# Patient Record
Sex: FEMALE | Race: WHITE | NOT HISPANIC OR LATINO | Employment: PART TIME | URBAN - METROPOLITAN AREA
[De-identification: names, ages, dates, MRNs, and addresses within clinical notes are randomized per-mention and may not be internally consistent; named-entity substitution may affect disease eponyms.]

---

## 2024-03-15 ENCOUNTER — OFFICE VISIT (OUTPATIENT)
Dept: INTERNAL MEDICINE | Facility: CLINIC | Age: 29
End: 2024-03-15
Payer: COMMERCIAL

## 2024-03-15 VITALS
TEMPERATURE: 98 F | DIASTOLIC BLOOD PRESSURE: 70 MMHG | HEART RATE: 78 BPM | SYSTOLIC BLOOD PRESSURE: 116 MMHG | BODY MASS INDEX: 26.29 KG/M2 | WEIGHT: 139.13 LBS | RESPIRATION RATE: 16 BRPM

## 2024-03-15 DIAGNOSIS — Z3A.01 LESS THAN 8 WEEKS GESTATION OF PREGNANCY: Primary | ICD-10-CM

## 2024-03-15 NOTE — PROGRESS NOTES
Subjective       Moises Vasquez is a 28 y.o. female.     Chief Complaint   Patient presents with    Referral     OBGYN    pregnant       History obtained from the patient.      History of Present Illness       The patient is here for a referral to OB/GYN.  She states she has had multiple positive home pregnancy test.  Her last menstrual period was 2/7/2024 through 2/10/2024.  Overall she feels well.  She has some mild breast tenderness.  There is no fatigue, nausea, or vomiting.  She has not started prenatal vitamins.    The following portions of the patient's history were reviewed and updated as appropriate: allergies, current medications, past family history, past medical history, past social history, past surgical history, and problem list.      Review of Systems   Constitutional:  Negative for fatigue.   Gastrointestinal:  Negative for nausea and vomiting.           Objective     Blood pressure 116/70, pulse 78, temperature 98 °F (36.7 °C), temperature source Infrared, resp. rate 16, weight 63.1 kg (139 lb 2 oz), not currently breastfeeding.    Physical Exam  Vitals and nursing note reviewed.   Constitutional:       Comments: BMI greater than 25.   Neurological:      Mental Status: She is alert.   Psychiatric:         Mood and Affect: Mood normal.           Assessment & Plan   Diagnoses and all orders for this visit:    1. Less than 8 weeks gestation of pregnancy (Primary)  -     Ambulatory Referral to Obstetrics / Gynecology    The only medication the patient is currently on is Albuterol MDI, and I assured her she could continue to use that as needed.  Recommended she start a prenatal vitamin daily as soon as possible.    Recommended she check with her PCP or her GYN prior to starting any prescription or over-the-counter medication.          Return prn

## 2024-04-12 ENCOUNTER — INITIAL PRENATAL (OUTPATIENT)
Dept: OBSTETRICS AND GYNECOLOGY | Facility: CLINIC | Age: 29
End: 2024-04-12
Payer: COMMERCIAL

## 2024-04-12 VITALS — WEIGHT: 139 LBS | DIASTOLIC BLOOD PRESSURE: 76 MMHG | BODY MASS INDEX: 26.26 KG/M2 | SYSTOLIC BLOOD PRESSURE: 102 MMHG

## 2024-04-12 DIAGNOSIS — Z34.90 PRENATAL CARE, ANTEPARTUM: Primary | ICD-10-CM

## 2024-04-12 NOTE — PROGRESS NOTES
Initial ob visit     CC- Here for care of pregnancy        Moises Vasquez is a 28 y.o. female, , who presents for her first obstetrical visit.  Patient's last menstrual period was 2024.. Her GABBIE is 2024, by Last Menstrual Period. Current GA is 9w2d.     Initial positive test date : 3/12/24, UPT        Her periods are every 29-30 days.  Prior obstetric issues: none  Patient's past medical history is significant for:  none .  Family history of genetic issues (includes FOB): none  Prior infections concerning in pregnancy (Rash, fever in last 2 weeks): No  Varicella Hx - history of chicken pox  Prior testing for Cystic Fibrosis Carrier or Sickle Cell Trait- never  Prepregnancy BMI - Body mass index is 26.26 kg/m².  History of STD: no  Hx of HSV for patient or partner: no  US done today: Yes.  Findings showed single viable IUP with FHR of 171bpm measuring 9w0d which is consistent with her LMP.  I have personally evaluated the U/S and agree with the findings. Natasha Harper MD      OB History    Para Term  AB Living   1             SAB IAB Ectopic Molar Multiple Live Births                    # Outcome Date GA Lbr Umesh/2nd Weight Sex Type Anes PTL Lv   1 Current                Additional Pertinent History   Last Pap : 22 Result: negative HPV: unknown      Last Completed Pap Smear            PAP SMEAR (Every 3 Years) Next due on 2022  LIQUID-BASED PAP SMEAR, [facility] ([facility])    2016  Done                  History of abnormal Pap smear: no  Family history of uterine, colon, breast, or ovarian cancer: yes - PGF with colon cancer  Feelings of Anxiety or Depression: no  Tobacco Usage?: No   Alcohol/Drug Use?: NO  Over the age of 35 at delivery: no  Genetic Screening: desires to discuss in the future      PMH    Current Outpatient Medications:     albuterol sulfate  (90 Base) MCG/ACT inhaler, INHALE 2 PUFFS BY MOUTH EVERY 4 HOURS AS  NEEDED FOR WHEEZE, Disp: 1 g, Rfl: 1     Past Medical History:   Diagnosis Date    Allergic     Asthma         History reviewed. No pertinent surgical history.    Review of Systems   Review of Systems    Patient Reports:  some nausea and vomiting a couple of times  Patient Denies:excessive nausea , excessive vomiting, and vaginal bleeding  All systems reviewed and otherwise normal.    I have reviewed and agree with the HPI, ROS, and historical information as entered above. Natasha Harper MD      /76   Wt 63 kg (139 lb)   LMP 2024   BMI 26.26 kg/m²     The additional following portions of the patient's history were reviewed and updated as appropriate: allergies, current medications, past family history, past medical history, past social history, past surgical history, and problem list.    Physical Exam  General:  well developed; well nourished  no acute distress   Chest/Respiratory: No labored breathing, normal respiratory effort, normal appearance, no respiratory noises noted   Heart:  normal rate, regular rhythm,  no murmurs, rubs, or gallops   Thyroid: normal to inspection and palpation   Breasts:  Not performed.   Abdomen: soft, non-tender; no masses  no umbilical or inguinal hernias are present  no hepato-splenomegaly   Pelvis: Not performed.        Assessment and Plan    Problem List Items Addressed This Visit          Gravid and     Prenatal care, antepartum - Primary    Relevant Orders    Obstetric Panel    HIV-1 / O / 2 Ag / Antibody    Urine Culture - Urine, Urine, Clean Catch    Urinalysis With Microscopic - Urine, Clean Catch    Chlamydia trachomatis, Neisseria gonorrhoeae, PCR - Urine, Urine, Clean Catch       Pregnancy at 9w2d  Reviewed routine prenatal care with the office and educational materials given  Lab(s) Ordered  Discussed options for genetic testing including first trimester nuchal translucency screen, genetic disease carrier testing, quadruple screen, and  NIPT  Nausea/Vomiting - she does not desire medications at this time.  Discussed conservative ways to help with nausea.  Patient is on Prenatal vitamins  Return in about 3 weeks (around 5/3/2024).      Natasha Harper MD  04/12/2024

## 2024-04-13 LAB
ABO GROUP BLD: NORMAL
APPEARANCE UR: CLEAR
BACTERIA #/AREA URNS HPF: NORMAL /[HPF]
BASOPHILS # BLD AUTO: 0 X10E3/UL (ref 0–0.2)
BASOPHILS NFR BLD AUTO: 0 %
BILIRUB UR QL STRIP: NEGATIVE
BLD GP AB SCN SERPL QL: NEGATIVE
CASTS URNS QL MICRO: NORMAL /LPF
COLOR UR: YELLOW
EOSINOPHIL # BLD AUTO: 0 X10E3/UL (ref 0–0.4)
EOSINOPHIL NFR BLD AUTO: 0 %
EPI CELLS #/AREA URNS HPF: NORMAL /HPF (ref 0–10)
ERYTHROCYTE [DISTWIDTH] IN BLOOD BY AUTOMATED COUNT: 13 % (ref 11.7–15.4)
GLUCOSE UR QL STRIP: NEGATIVE
HBV SURFACE AG SERPL QL IA: NEGATIVE
HCT VFR BLD AUTO: 39 % (ref 34–46.6)
HCV IGG SERPL QL IA: NON REACTIVE
HGB BLD-MCNC: 13.1 G/DL (ref 11.1–15.9)
HGB UR QL STRIP: NEGATIVE
HIV 1+2 AB+HIV1 P24 AG SERPL QL IA: NON REACTIVE
IMM GRANULOCYTES # BLD AUTO: 0 X10E3/UL (ref 0–0.1)
IMM GRANULOCYTES NFR BLD AUTO: 0 %
KETONES UR QL STRIP: NEGATIVE
LEUKOCYTE ESTERASE UR QL STRIP: NEGATIVE
LYMPHOCYTES # BLD AUTO: 2.6 X10E3/UL (ref 0.7–3.1)
LYMPHOCYTES NFR BLD AUTO: 28 %
MCH RBC QN AUTO: 31.6 PG (ref 26.6–33)
MCHC RBC AUTO-ENTMCNC: 33.6 G/DL (ref 31.5–35.7)
MCV RBC AUTO: 94 FL (ref 79–97)
MICRO URNS: NORMAL
MICRO URNS: NORMAL
MONOCYTES # BLD AUTO: 0.7 X10E3/UL (ref 0.1–0.9)
MONOCYTES NFR BLD AUTO: 7 %
NEUTROPHILS # BLD AUTO: 5.9 X10E3/UL (ref 1.4–7)
NEUTROPHILS NFR BLD AUTO: 65 %
NITRITE UR QL STRIP: NEGATIVE
PH UR STRIP: 6.5 [PH] (ref 5–7.5)
PLATELET # BLD AUTO: 309 X10E3/UL (ref 150–450)
PROT UR QL STRIP: NEGATIVE
RBC # BLD AUTO: 4.14 X10E6/UL (ref 3.77–5.28)
RBC #/AREA URNS HPF: NORMAL /HPF (ref 0–2)
RH BLD: POSITIVE
RPR SER QL: NON REACTIVE
RUBV IGG SERPL IA-ACNC: 2.61 INDEX
SP GR UR STRIP: 1.02 (ref 1–1.03)
UROBILINOGEN UR STRIP-MCNC: 0.2 MG/DL (ref 0.2–1)
WBC # BLD AUTO: 9.4 X10E3/UL (ref 3.4–10.8)
WBC #/AREA URNS HPF: NORMAL /HPF (ref 0–5)

## 2024-04-14 LAB
BACTERIA UR CULT: NO GROWTH
BACTERIA UR CULT: NORMAL

## 2024-04-15 LAB
C TRACH RRNA SPEC QL NAA+PROBE: NEGATIVE
N GONORRHOEA RRNA SPEC QL NAA+PROBE: NEGATIVE

## 2024-05-02 ENCOUNTER — ROUTINE PRENATAL (OUTPATIENT)
Dept: OBSTETRICS AND GYNECOLOGY | Facility: CLINIC | Age: 29
End: 2024-05-02
Payer: COMMERCIAL

## 2024-05-02 VITALS — WEIGHT: 139 LBS | BODY MASS INDEX: 26.26 KG/M2 | DIASTOLIC BLOOD PRESSURE: 80 MMHG | SYSTOLIC BLOOD PRESSURE: 110 MMHG

## 2024-05-02 DIAGNOSIS — Z34.90 PRENATAL CARE, ANTEPARTUM: Primary | ICD-10-CM

## 2024-05-02 LAB
GLUCOSE UR STRIP-MCNC: NEGATIVE MG/DL
PROT UR STRIP-MCNC: NEGATIVE MG/DL

## 2024-05-02 NOTE — PROGRESS NOTES
OB FOLLOW UP  CC- Here for care of pregnancy        Moises Vasquez is a 28 y.o.  12w1d patient being seen today for her obstetrical follow up visit. Patient reports no complaints.     Her prenatal care is complicated by (and status) :   Patient Active Problem List   Diagnosis    Mild intermittent asthma without complication    Prenatal care, antepartum       Genetic testing?: declines.  NOB labs reviewed    Ultrasound Today: No    ROS -   Patient Denies: leaking of fluid, vaginal bleeding, and excessive vomiting  All other systems reviewed and are negative.     The additional following portions of the patient's history were reviewed and updated as appropriate: allergies, current medications, past family history, past medical history, past social history, past surgical history, and problem list.    I have reviewed and agree with the HPI, ROS, and historical information as entered above. Natasha Harper MD          /80   Wt 63 kg (139 lb)   LMP 2024   BMI 26.26 kg/m²         EXAM:     Prenatal Vitals  BP: 110/80  Weight: 63 kg (139 lb)   Fetal Heart Rate: 165          Urine Glucose Read-only: Negative  Urine Protein Read-only: Negative       Assessment and Plan    Problem List Items Addressed This Visit          Gravid and     Prenatal care, antepartum - Primary    Relevant Orders    POC Urinalysis Dipstick (Completed)       Pregnancy at 12w1d  Labs reviewed from New OB Visit.  Counseled on genetic testing, carrier status and option for NT screen  Activity and Exercise discussed.  Patient is on Prenatal vitamins  Declines genetic testing  Return in about 4 weeks (around 2024).    Natasha Harper MD  2024

## 2024-05-30 ENCOUNTER — ROUTINE PRENATAL (OUTPATIENT)
Dept: OBSTETRICS AND GYNECOLOGY | Facility: CLINIC | Age: 29
End: 2024-05-30
Payer: COMMERCIAL

## 2024-05-30 VITALS — WEIGHT: 144.4 LBS | BODY MASS INDEX: 27.28 KG/M2 | DIASTOLIC BLOOD PRESSURE: 68 MMHG | SYSTOLIC BLOOD PRESSURE: 104 MMHG

## 2024-05-30 DIAGNOSIS — Z34.90 PRENATAL CARE, ANTEPARTUM: Primary | ICD-10-CM

## 2024-05-30 LAB
GLUCOSE UR STRIP-MCNC: NEGATIVE MG/DL
PROT UR STRIP-MCNC: NEGATIVE MG/DL

## 2024-05-30 RX ORDER — PRENATAL VIT NO.126/IRON/FOLIC 28MG-0.8MG
TABLET ORAL DAILY
COMMUNITY

## 2024-05-30 NOTE — PROGRESS NOTES
OB FOLLOW UP  CC- Here for care of pregnancy        Moises Vasquez is a 28 y.o.  16w1d patient being seen today for her obstetrical follow up visit. Patient reports occasional headaches, pt states she thinks it is related to caffeine, headaches have improved.     Her prenatal care is complicated by (and status) :   Patient Active Problem List   Diagnosis    Mild intermittent asthma without complication    Prenatal care, antepartum         Ultrasound Today: No    AFP: declines    ROS -   Patient Denies: leaking of fluid, vaginal bleeding, dysuria, excessive vomiting, and more than 6 contractions per hour  All other systems reviewed and are negative.       The additional following portions of the patient's history were reviewed and updated as appropriate: allergies and current medications.      I have reviewed and agree with the HPI, ROS, and historical information as entered above. Natasha Harper MD          EXAM:     Prenatal Vitals  BP: 104/68  Weight: 65.5 kg (144 lb 6.4 oz)   Fetal Heart Rate: 158         Urine Glucose Read-only: Negative  Urine Protein Read-only: Negative           Assessment and Plan    Problem List Items Addressed This Visit          Gravid and     Prenatal care, antepartum - Primary    Relevant Orders    US Ob 14 + Weeks Single or First Gestation    POC Urinalysis Dipstick (Completed)       Pregnancy at 16w1d  Fetal status reassuring.   Counseled on MSAFP alone in relation to OTD and placental issues.    Anatomy scan next visit.   Activity and Exercise discussed.  Patient is on Prenatal vitamins  Return in about 4 weeks (around 2024) for anatomy usg.    Natasha Harper MD  2024

## 2024-06-16 DIAGNOSIS — J45.20 MILD INTERMITTENT ASTHMA WITHOUT COMPLICATION: ICD-10-CM

## 2024-06-17 ENCOUNTER — TELEPHONE (OUTPATIENT)
Dept: OBSTETRICS AND GYNECOLOGY | Facility: CLINIC | Age: 29
End: 2024-06-17
Payer: COMMERCIAL

## 2024-06-17 RX ORDER — ALBUTEROL SULFATE 90 UG/1
2 AEROSOL, METERED RESPIRATORY (INHALATION) EVERY 4 HOURS PRN
Qty: 1 G | Refills: 1 | Status: SHIPPED | OUTPATIENT
Start: 2024-06-17

## 2024-06-17 NOTE — TELEPHONE ENCOUNTER
FREDIS Wong (SPOUSE)    [phone]    PT IS 19wks    PT PUT A MSG 6/5/24 ABOUT MIGRAINE     A LOT OF BACK & FORTH COMMUNICATION     CAN SOMEONE GIVE THEM A CALL     THE MIGRAINE IS CONTINUING, SHE HAD 1-2 GOOD DAYS ONCE SHE STARTED MAGNESIUM    PER  NOT DOING WELL.

## 2024-06-17 NOTE — TELEPHONE ENCOUNTER
"Patient of Dr. Harper; G1 @ 18w 5d. LOV 05/30/24.  Attempted to reach patient. Left voice message informing her I will return her 's call.   Returned call to patient's .   States patient has been having H/A's almost daily for the past couple of weeks and is miserable.  Spoke with patient. She started taking Magnesium 400 mg once daily about 2 weeks ago with no relief.   States she has a \"regular\" H/A daily but has had 4 migraines in the past 3 weeks, including the one she has now. Has N&V with the migraines but no visual changes.   States she has checked her BP and it is normal. Denies any other symptoms or problems.   Discussed with JED Hines. She states if patient has not had any other caffeine today, she can try Excedrine Migraine or she can try: drink one coke, drink one bottle of water, Benadryl 50 mg, Tylenol 1000 mg, warm shower, and rest. If H/A still unrelieved she will need to be seen.   Informed patient. She v/u and agreed.   "

## 2024-06-27 ENCOUNTER — ROUTINE PRENATAL (OUTPATIENT)
Dept: OBSTETRICS AND GYNECOLOGY | Facility: CLINIC | Age: 29
End: 2024-06-27
Payer: COMMERCIAL

## 2024-06-27 VITALS — DIASTOLIC BLOOD PRESSURE: 72 MMHG | WEIGHT: 147 LBS | SYSTOLIC BLOOD PRESSURE: 108 MMHG | BODY MASS INDEX: 27.78 KG/M2

## 2024-06-27 DIAGNOSIS — Z34.90 PRENATAL CARE, ANTEPARTUM: Primary | ICD-10-CM

## 2024-06-27 DIAGNOSIS — G43.819 OTHER MIGRAINE WITHOUT STATUS MIGRAINOSUS, INTRACTABLE: ICD-10-CM

## 2024-06-27 LAB
GLUCOSE UR STRIP-MCNC: NEGATIVE MG/DL
PROT UR STRIP-MCNC: NEGATIVE MG/DL

## 2024-06-27 PROCEDURE — 0502F SUBSEQUENT PRENATAL CARE: CPT | Performed by: OBSTETRICS & GYNECOLOGY

## 2024-06-27 RX ORDER — MAGNESIUM OXIDE 400 MG/1
400 TABLET ORAL DAILY
COMMUNITY

## 2024-06-27 RX ORDER — ACETAMINOPHEN 500 MG
500 TABLET ORAL EVERY 6 HOURS PRN
COMMUNITY

## 2024-06-27 NOTE — PROGRESS NOTES
OB FOLLOW UP  CC- Here for care of pregnancy        Moises Vasquez is a 28 y.o.  20w1d patient being seen today for her obstetrical follow up visit. Patient reports daily headaches x1 month, occasional migraines, and vomiting with headaches/migraines. Patient reports taking Tylenol, Magnesium, and JNA's headache cocktail for headaches and migraines. Patient states that the migraine cocktail aborted her migraine last week, but has had moderate to severe headaches daily since then. Patient denies h/o headaches or migraines prior to pregnancy. Patient reports drinking about a quarter of a gallon of water and 1 coffee daily.     Her prenatal care is complicated by (and status) : see below.  Patient Active Problem List   Diagnosis    Mild intermittent asthma without complication    Prenatal care, antepartum       US done today: Yes.  Findings showed Fetus in breech presentation fetal heart rate of 154.  Posterior placenta and three-vessel cord noted.  Normal fluid volume.  Size consistent with dates with estimated fetal weight of 10 ounces.  No fetal anomaly seen, however unable to adequately visualize profile, heart views, and lower extremities.  Cervical length 46 mm..  I have personally evaluated the U/S and agree with the findings. Natasha Harper MD    AFP was declined.    ROS -     Patient Denies: Leaking of fluid, vaginal bleeding, dysuria, excessive vomiting, and more than 6 contractions per hour  Fetal Movement : Yes  All other systems reviewed and are negative.       The additional following portions of the patient's history were reviewed and updated as appropriate: allergies and current medications.      I have reviewed and agree with the HPI, ROS, and historical information as entered above. Natasha Harper MD      /72   Wt 66.7 kg (147 lb)   LMP 2024   BMI 27.78 kg/m²       EXAM:     Prenatal Vitals  BP: 108/72  Weight: 66.7 kg (147 lb)   Fetal Heart Rate: 154/US           Urine Glucose Read-only: Negative  Urine Protein Read-only: Negative       Assessment and Plan    Problem List Items Addressed This Visit          Gravid and     Prenatal care, antepartum - Primary    Relevant Orders    POC Urinalysis Dipstick (Completed)    Ambulatory Referral to Neurology    US Ob Follow Up Transabdominal Approach     Other Visit Diagnoses       Other migraine without status migrainosus, intractable        Relevant Medications    acetaminophen (TYLENOL) 500 MG tablet    Other Relevant Orders    Ambulatory Referral to Neurology            Pregnancy at 20w1d  Anatomy scan today is incomplete, follow up in 4 weeks for additional views. Anatomy that was visualized was within normal limits.  Fetal status reassuring.   Activity and Exercise discussed.  Patient is on Prenatal vitamins  Return in about 4 weeks (around 2024) for ultrasound.      Natasha Harper MD  2024

## 2024-07-29 ENCOUNTER — ROUTINE PRENATAL (OUTPATIENT)
Dept: OBSTETRICS AND GYNECOLOGY | Facility: CLINIC | Age: 29
End: 2024-07-29
Payer: COMMERCIAL

## 2024-07-29 VITALS — BODY MASS INDEX: 28.72 KG/M2 | DIASTOLIC BLOOD PRESSURE: 64 MMHG | SYSTOLIC BLOOD PRESSURE: 110 MMHG | WEIGHT: 152 LBS

## 2024-07-29 DIAGNOSIS — Z34.90 PRENATAL CARE, ANTEPARTUM: Primary | ICD-10-CM

## 2024-07-29 LAB
GLUCOSE UR STRIP-MCNC: NEGATIVE MG/DL
PROT UR STRIP-MCNC: NEGATIVE MG/DL

## 2024-07-29 PROCEDURE — 0502F SUBSEQUENT PRENATAL CARE: CPT | Performed by: ADVANCED PRACTICE MIDWIFE

## 2024-07-29 NOTE — PROGRESS NOTES
OB FOLLOW UP  CC- Here for care of pregnancy        Moises Vasquez is a 28 y.o.  24w5d patient being seen today for her obstetrical follow up visit. Patient reports no complaints.     Her prenatal care is complicated by (and status) : see below.  Patient Active Problem List   Diagnosis    Mild intermittent asthma without complication    Prenatal care, antepartum       US done today: Yes.  Findings showed , Vertex, 3VC, normal amount of AF. EFW 1#8oz (37%) and AC 33%. Anatomy not previously seen is normal.   I have personally evaluated the U/S and agree with the findings. JED Carrera    Reviewed 1 hr glucose testing and TDAP next visit.    ROS -   Patient Denies: leaking of fluid, vaginal bleeding, and excessive vomiting  Fetal Movement : normal  All other systems reviewed and are negative.       The additional following portions of the patient's history were reviewed and updated as appropriate: allergies, current medications, past family history, past medical history, past social history, past surgical history, and problem list.      I have reviewed and agree with the HPI, ROS, and historical information as entered above. JED Carrera      /64   Wt 68.9 kg (152 lb)   LMP 2024   BMI 28.72 kg/m²       EXAM:     Prenatal Vitals  BP: 110/64  Weight: 68.9 kg (152 lb)   Fetal Heart Rate: 148bpm               Urine Glucose Read-only: Negative  Urine Protein Read-only: Negative       Assessment and Plan    Problem List Items Addressed This Visit       Prenatal care, antepartum - Primary    Relevant Orders    POC Urinalysis Dipstick (Completed)       Pregnancy at 24w5d  Fetal status reassuring.  anatomy scan completed today and within normal limits.  1 hour gtt, CBC, Antibody screen, TDAP, and RPR next visit. Instructions given  Discussed/encouraged TDAP vaccination after 28 weeks  Reviewed Pre-eclampsia signs/symptoms  Activity and Exercise discussed.  Return in about 4  weeks (around 8/26/2024) for Meredith MARTINEZ.      Serena Plaza, JED  07/29/2024

## 2024-08-20 ENCOUNTER — TELEPHONE (OUTPATIENT)
Dept: OBSTETRICS AND GYNECOLOGY | Facility: CLINIC | Age: 29
End: 2024-08-20
Payer: COMMERCIAL

## 2024-08-20 ENCOUNTER — TELEPHONE (OUTPATIENT)
Dept: NEUROLOGY | Facility: CLINIC | Age: 29
End: 2024-08-20
Payer: COMMERCIAL

## 2024-08-20 NOTE — TELEPHONE ENCOUNTER
[facility] received a fax that requires your attention. The document has been indexed to the patient’s chart for your review.      Reason for sending: NEEDS PROVIDER SIGNATURE    Documents Description: WRITTEN ORDER FOR BREAST PUMP AND SUPPLIES    Name of Sender: SHANKAR    Date Indexed: 08/20/24

## 2024-08-20 NOTE — TELEPHONE ENCOUNTER
----- Message from [facility] d/b/a [employer] sent at 8/20/2024 11:54 AM EDT -----  Regarding: Appointment Cancellation Request  Contact: [phone]  Moises Vasquez would like to cancel the following appointments:    Marvin Krishnamurthy in [facility] ([phone]), 10/3/2024  3:00 PM    Comments:  No longer having daily headaches.

## 2024-08-23 ENCOUNTER — TELEPHONE (OUTPATIENT)
Dept: OBSTETRICS AND GYNECOLOGY | Facility: CLINIC | Age: 29
End: 2024-08-23
Payer: COMMERCIAL

## 2024-08-24 NOTE — TELEPHONE ENCOUNTER
Provider: DR. MADI PEDRAZA      The [facility] received a fax that requires your attention. The document has been indexed to the patient's chart for your review.     Reason for sending: REVIEW / AUTHORIZATION     Documents Description: WRITTEN ORDER FOR BREAST PUMP     Name of Sender: [employer]     Date Indexed: 08/23/24    Notes (if needed): INDEXED INTO CHART AS EXT MED RECS 08/21/24.

## 2024-08-26 NOTE — TELEPHONE ENCOUNTER
Patient is a 28 year old,  patient of Dr. Harper. She is currently 28w5d gestation. She is calling about having a breast pump form signed and faxed. Stamped form and place in fax folder up front. Called and informed patient that form should be faxed today. She verbalized understanding.

## 2024-08-27 ENCOUNTER — ROUTINE PRENATAL (OUTPATIENT)
Dept: OBSTETRICS AND GYNECOLOGY | Facility: CLINIC | Age: 29
End: 2024-08-27
Payer: COMMERCIAL

## 2024-08-27 VITALS — DIASTOLIC BLOOD PRESSURE: 66 MMHG | WEIGHT: 158 LBS | BODY MASS INDEX: 29.85 KG/M2 | SYSTOLIC BLOOD PRESSURE: 104 MMHG

## 2024-08-27 DIAGNOSIS — Z34.90 PRENATAL CARE, ANTEPARTUM: Primary | ICD-10-CM

## 2024-08-27 LAB
ERYTHROCYTE [DISTWIDTH] IN BLOOD BY AUTOMATED COUNT: 12.7 % (ref 12.3–15.4)
GLUCOSE 1H P 50 G GLC PO SERPL-MCNC: 103 MG/DL (ref 65–139)
GLUCOSE UR STRIP-MCNC: NEGATIVE MG/DL
HCT VFR BLD AUTO: 36 % (ref 34–46.6)
HGB BLD-MCNC: 12 G/DL (ref 12–15.9)
MCH RBC QN AUTO: 32.4 PG (ref 26.6–33)
MCHC RBC AUTO-ENTMCNC: 33.3 G/DL (ref 31.5–35.7)
MCV RBC AUTO: 97.3 FL (ref 79–97)
PLATELET # BLD AUTO: 297 10*3/MM3 (ref 140–450)
PROT UR STRIP-MCNC: NEGATIVE MG/DL
RBC # BLD AUTO: 3.7 10*6/MM3 (ref 3.77–5.28)
WBC # BLD AUTO: 9.18 10*3/MM3 (ref 3.4–10.8)

## 2024-08-27 PROCEDURE — 0502F SUBSEQUENT PRENATAL CARE: CPT | Performed by: OBSTETRICS & GYNECOLOGY

## 2024-08-27 NOTE — PROGRESS NOTES
OB FOLLOW UP  CC- Here for care of pregnancy        Moises Vasquez is a 28 y.o.  28w6d patient being seen today for her obstetrical follow up. Patient reports no complaints.     Patient undergoing Glucola testing today. She is due for her testing at 9:50.       MBT: A+  Rhogam: is not indicated.  28 week packet: reviewed with patient , counseled on fetal movement , pediatrician list reviewed, breast pump discussed, and childbirth classes reviewed  TDAP: declines  Ultrasound Today: No    Her prenatal care is complicated by (and status) : see below.  Patient Active Problem List   Diagnosis    Mild intermittent asthma without complication    Prenatal care, antepartum         ROS -   Patient Denies: Loss of Fluid, Vaginal Spotting, Vision Changes, Headaches, Nausea , Vomiting , Contractions, Epigastric pain, and skin itching  Fetal Movement : normal    The additional following portions of the patient's history were reviewed and updated as appropriate: allergies and current medications.    I have reviewed and agree with the HPI, ROS, and historical information as entered above. Natasha Harper MD      /66   Wt 71.7 kg (158 lb)   LMP 2024   BMI 29.85 kg/m²         EXAM:     Prenatal Vitals  BP: 104/66  Weight: 71.7 kg (158 lb)   Fetal Heart Rate: 143      Fundal Height (cm): 29 cm        Urine Glucose Read-only: Negative  Urine Protein Read-only: Negative         Assessment and Plan    Problem List Items Addressed This Visit          Gravid and     Prenatal care, antepartum - Primary    Overview     Anatomy complete and WNL at 24w5d         Relevant Orders    POC Urinalysis Dipstick (Completed)    CBC (No Diff)    Gestational Screen 1 Hr ([facility])    Antibody Screen    RPR, Rfx Qn RPR / Confirm TP       Pregnancy at 28w6d  1 hr Glucola, CBC, RPR. Antibody screen and TDAP declines  Fetal movement/PTL or Labor precautions  Activity and Exercise discussed.  Return in about 4  weeks (around 9/24/2024).        Natasha Harper MD  08/27/2024

## 2024-08-28 LAB
BLD GP AB SCN SERPL QL: NEGATIVE
RPR SER QL: NON REACTIVE

## 2024-09-23 ENCOUNTER — ROUTINE PRENATAL (OUTPATIENT)
Dept: OBSTETRICS AND GYNECOLOGY | Facility: CLINIC | Age: 29
End: 2024-09-23
Payer: COMMERCIAL

## 2024-09-23 VITALS — SYSTOLIC BLOOD PRESSURE: 102 MMHG | WEIGHT: 162.4 LBS | BODY MASS INDEX: 30.69 KG/M2 | DIASTOLIC BLOOD PRESSURE: 64 MMHG

## 2024-09-23 DIAGNOSIS — Z34.93 PRENATAL CARE IN THIRD TRIMESTER: Primary | ICD-10-CM

## 2024-09-23 LAB
GLUCOSE UR STRIP-MCNC: NEGATIVE MG/DL
PROT UR STRIP-MCNC: NEGATIVE MG/DL

## 2024-09-23 PROCEDURE — 0502F SUBSEQUENT PRENATAL CARE: CPT | Performed by: OBSTETRICS & GYNECOLOGY

## 2024-10-07 ENCOUNTER — ROUTINE PRENATAL (OUTPATIENT)
Dept: OBSTETRICS AND GYNECOLOGY | Facility: CLINIC | Age: 29
End: 2024-10-07
Payer: COMMERCIAL

## 2024-10-07 VITALS — DIASTOLIC BLOOD PRESSURE: 76 MMHG | SYSTOLIC BLOOD PRESSURE: 110 MMHG | WEIGHT: 166.2 LBS | BODY MASS INDEX: 31.4 KG/M2

## 2024-10-07 DIAGNOSIS — O26.849 UTERINE SIZE DATE DISCREPANCY PREGNANCY: ICD-10-CM

## 2024-10-07 DIAGNOSIS — Z34.93 PRENATAL CARE IN THIRD TRIMESTER: Primary | ICD-10-CM

## 2024-10-07 LAB
GLUCOSE UR STRIP-MCNC: NEGATIVE MG/DL
PROT UR STRIP-MCNC: NEGATIVE MG/DL

## 2024-10-07 PROCEDURE — 0502F SUBSEQUENT PRENATAL CARE: CPT | Performed by: OBSTETRICS & GYNECOLOGY

## 2024-10-07 NOTE — PROGRESS NOTES
OB FOLLOW UP  CC- Here for care of pregnancy        Moises Vasquez is a 29 y.o.  34w5d patient being seen today for her obstetrical follow up visit. Patient reports no complaints.     Her prenatal care is complicated by (and status) :   Patient Active Problem List   Diagnosis    Mild intermittent asthma without complication    Prenatal care, antepartum       Flu Status: Declines  RSV: declines.  Ultrasound Today: No  Non Stress Test: No.    ROS -   Patient Denies: Loss of Fluid, Vaginal Spotting, Vision Changes, Headaches, Nausea , Vomiting , Contractions, Epigastric pain, and skin itching  Fetal Movement : normal  All other systems reviewed and are negative.       The additional following portions of the patient's history were reviewed and updated as appropriate: allergies, current medications, past family history, past medical history, past social history, past surgical history, and problem list.    I have reviewed and agree with the HPI, ROS, and historical information as entered above. Natasha Harper MD      /76   Wt 75.4 kg (166 lb 3.2 oz)   LMP 2024   BMI 31.40 kg/m²       EXAM:     Prenatal Vitals  BP: 110/76  Weight: 75.4 kg (166 lb 3.2 oz)   Fetal Heart Rate: 145      Fundal Height (cm): 36 cm        Urine Glucose Read-only: Negative  Urine Protein Read-only: Negative           Assessment and Plan    Problem List Items Addressed This Visit    None  Visit Diagnoses       Prenatal care in third trimester    -  Primary    Relevant Orders    POC Urinalysis Dipstick (Completed)    Uterine size date discrepancy pregnancy        Relevant Orders    US Ob Follow Up Transabdominal Approach            Pregnancy at 34w5d  Fetal status reassuring.   Activity and Exercise discussed.  Fetal movement/PTL or Labor precautions  U/S ordered at follow up  GBS next visit  Return in about 2 weeks (around 10/21/2024) for ultrasound.    Natasha Harper MD  10/07/2024

## 2024-10-22 ENCOUNTER — ROUTINE PRENATAL (OUTPATIENT)
Dept: OBSTETRICS AND GYNECOLOGY | Facility: CLINIC | Age: 29
End: 2024-10-22
Payer: COMMERCIAL

## 2024-10-22 ENCOUNTER — LAB (OUTPATIENT)
Dept: LAB | Facility: HOSPITAL | Age: 29
End: 2024-10-22
Payer: COMMERCIAL

## 2024-10-22 VITALS — WEIGHT: 170 LBS | SYSTOLIC BLOOD PRESSURE: 110 MMHG | BODY MASS INDEX: 32.12 KG/M2 | DIASTOLIC BLOOD PRESSURE: 70 MMHG

## 2024-10-22 DIAGNOSIS — Z34.90 PREGNANCY, UNSPECIFIED GESTATIONAL AGE: Primary | ICD-10-CM

## 2024-10-22 DIAGNOSIS — Z34.90 PRENATAL CARE, ANTEPARTUM: Primary | ICD-10-CM

## 2024-10-22 LAB
GLUCOSE UR STRIP-MCNC: NEGATIVE MG/DL
PROT UR STRIP-MCNC: NEGATIVE MG/DL

## 2024-10-22 PROCEDURE — 87081 CULTURE SCREEN ONLY: CPT

## 2024-10-22 NOTE — PROGRESS NOTES
OB FOLLOW UP  CC- Here for care of pregnancy        Moises Vasquez is a 29 y.o.  36w6d patient being seen today for her obstetrical follow up visit. Patient reports mild swelling in feet. Patient declines cervical check today.     Her prenatal care is complicated by (and status) : see below.  Patient Active Problem List   Diagnosis    Mild intermittent asthma without complication    Prenatal care, antepartum       GBS Status: Done Today. She is not allergic to PCN.    No Known Allergies       Flu Status: Declines  RSV status: declines.   Her Delivery Plan is: Does not desire IOL    US done today: Yes.  Findings showed   Female infant in cephalic presentation fetal heart rate of 126.  Posterior placenta and three-vessel cord noted.  Normal fluid volume with an VALERIA of 11.1.  Estimated fetal weight 6 pounds 3 ounces which is 32nd percentile.  Of note head measurements are first and 7th percentile.  This is most likely related to positioning..  I have personally evaluated the U/S and agree with the findings. Natasha Harper MD    Non Stress Test: No.    ROS -   Patient Denies: Loss of Fluid, Vaginal Spotting, and Contractions  Fetal Movement : normal  All other systems reviewed and are negative.       The additional following portions of the patient's history were reviewed and updated as appropriate: allergies and current medications.    I have reviewed and agree with the HPI, ROS, and historical information as entered above. Natasha Harper MD        EXAM:     Prenatal Vitals  BP: 110/70  Weight: 77.1 kg (170 lb)                              Assessment and Plan    Problem List Items Addressed This Visit          Gravid and     Prenatal care, antepartum - Primary    Overview     At 36 weeks and 6 days-  Female infant in cephalic presentation fetal heart rate of 126.  Posterior placenta and three-vessel cord noted.  Normal fluid volume with an VALERIA of 11.1.  Estimated fetal weight 6  pounds 3 ounces which is 32nd percentile.  Of note head measurements are first and 7th percentile.  This is most likely related to positioning.            Pregnancy at 36w6d  Fetal status reassuring.   Patient declines induction of labor or cervical check.  Reviewed Pre-eclampsia signs/symptoms  Delivery options reviewed with patient  Signs of labor reviewed  Kick counts reviewed  Activity and Exercise discussed.  Return in about 1 week (around 10/29/2024).    Natasha Harper MD  10/22/2024

## 2024-10-24 PROBLEM — B95.1 POSITIVE GBS TEST: Status: ACTIVE | Noted: 2024-10-24

## 2024-10-24 LAB — BACTERIA SPEC AEROBE CULT: ABNORMAL

## 2024-10-28 ENCOUNTER — ROUTINE PRENATAL (OUTPATIENT)
Dept: OBSTETRICS AND GYNECOLOGY | Facility: CLINIC | Age: 29
End: 2024-10-28
Payer: COMMERCIAL

## 2024-10-28 VITALS — WEIGHT: 170.4 LBS | BODY MASS INDEX: 32.2 KG/M2 | SYSTOLIC BLOOD PRESSURE: 104 MMHG | DIASTOLIC BLOOD PRESSURE: 64 MMHG

## 2024-10-28 DIAGNOSIS — B95.1 POSITIVE GBS TEST: ICD-10-CM

## 2024-10-28 DIAGNOSIS — Z34.90 PRENATAL CARE, ANTEPARTUM: Primary | ICD-10-CM

## 2024-10-28 PROCEDURE — 0502F SUBSEQUENT PRENATAL CARE: CPT | Performed by: OBSTETRICS & GYNECOLOGY

## 2024-10-28 NOTE — PROGRESS NOTES
OB FOLLOW UP  CC- Here for care of pregnancy        Moises Vasquez is a 29 y.o.  37w5d patient being seen today for her obstetrical follow up visit. Patient reports no complaints.     Pt unable to leave urine sample yet.     Her prenatal care is complicated by (and status) : see below.  Patient Active Problem List   Diagnosis    Mild intermittent asthma without complication    Prenatal care, antepartum    Positive GBS test       GBS Status:   Group B Strep Culture   Date Value Ref Range Status   10/22/2024 Streptococcus agalactiae (Group B) (A)  Final     Comment:       This organism is considered to be universally susceptible to penicillin.  No further antibiotic testing will be performed. If Clindamycin or Erythromycin is the drug of choice, notify the laboratory within 7 days to request susceptibility testing.         No Known Allergies       Flu Status: Declines  Her Delivery Plan is: Does not desire IOL    US today: no  Non Stress Test: No.    ROS -   Patient Denies: Loss of Fluid, Vaginal Spotting, Vision Changes, Headaches, Nausea , Vomiting , Contractions, Epigastric pain, and skin itching  Fetal Movement : normal  All other systems reviewed and are negative.       The additional following portions of the patient's history were reviewed and updated as appropriate: allergies and current medications.    I have reviewed and agree with the HPI, ROS, and historical information as entered above. Natasha Harper MD        EXAM:     Prenatal Vitals  BP: 104/64  Weight: 77.3 kg (170 lb 6.4 oz)   Fetal Heart Rate: 130   Fundal Height (cm): 38 cm                      Assessment and Plan    Problem List Items Addressed This Visit          Gravid and     Prenatal care, antepartum - Primary    Overview     At 36 weeks and 6 days-  Female infant in cephalic presentation fetal heart rate of 126.  Posterior placenta and three-vessel cord noted.  Normal fluid volume with an VALERIA of 11.1.   Estimated fetal weight 6 pounds 3 ounces which is 32nd percentile.  Of note head measurements are first and 7th percentile.  This is most likely related to positioning.            Other    Positive GBS test    Overview     Treat in labor            Pregnancy at 37w5d  Fetal status reassuring.   Reviewed Pre-eclampsia signs/symptoms  Delivery options reviewed with patient  Signs of labor reviewed  Kick counts reviewed  Activity and Exercise discussed.  Return in about 1 week (around 11/4/2024).    Natasha Harper MD  10/28/2024

## 2024-11-04 ENCOUNTER — ROUTINE PRENATAL (OUTPATIENT)
Dept: OBSTETRICS AND GYNECOLOGY | Facility: CLINIC | Age: 29
End: 2024-11-04
Payer: COMMERCIAL

## 2024-11-04 VITALS — SYSTOLIC BLOOD PRESSURE: 106 MMHG | WEIGHT: 170.6 LBS | DIASTOLIC BLOOD PRESSURE: 66 MMHG | BODY MASS INDEX: 32.23 KG/M2

## 2024-11-04 DIAGNOSIS — B95.1 POSITIVE GBS TEST: ICD-10-CM

## 2024-11-04 DIAGNOSIS — Z34.90 PRENATAL CARE, ANTEPARTUM: Primary | ICD-10-CM

## 2024-11-04 LAB
GLUCOSE UR STRIP-MCNC: NEGATIVE MG/DL
PROT UR STRIP-MCNC: NEGATIVE MG/DL

## 2024-11-04 PROCEDURE — 0502F SUBSEQUENT PRENATAL CARE: CPT | Performed by: OBSTETRICS & GYNECOLOGY

## 2024-11-04 NOTE — PROGRESS NOTES
OB FOLLOW UP  CC- Here for care of pregnancy        Moises Vasquez is a 29 y.o.  38w5d patient being seen today for her obstetrical follow up visit. Patient reports no complaints today.      Her prenatal care is complicated by (and status) : see below.  Patient Active Problem List   Diagnosis    Mild intermittent asthma without complication    Prenatal care, antepartum    Positive GBS test       GBS Status:   Group B Strep Culture   Date Value Ref Range Status   10/22/2024 Streptococcus agalactiae (Group B) (A)  Final     Comment:       This organism is considered to be universally susceptible to penicillin.  No further antibiotic testing will be performed. If Clindamycin or Erythromycin is the drug of choice, notify the laboratory within 7 days to request susceptibility testing.         No Known Allergies       Flu Status: Declines  Her Delivery Plan is: Does not desire IOL    US today: no  Non Stress Test: No.    ROS -   Patient Denies: Loss of Fluid, Vaginal Spotting, Vision Changes, Headaches, Nausea , Vomiting , Contractions, Epigastric pain, and skin itching  Fetal Movement : normal  All other systems reviewed and are negative.       The additional following portions of the patient's history were reviewed and updated as appropriate: allergies and current medications.    I have reviewed and agree with the HPI, ROS, and historical information as entered above. Natasha Harper MD        EXAM:     Prenatal Vitals  BP: 106/66  Weight: 77.4 kg (170 lb 9.6 oz)   Fetal Heart Rate: 143   Fundal Height (cm): 38 cm          Urine Glucose Read-only: Negative  Urine Protein Read-only: Negative           Assessment and Plan    Problem List Items Addressed This Visit          Gravid and     Prenatal care, antepartum - Primary    Overview     At 36 weeks and 6 days-  Female infant in cephalic presentation fetal heart rate of 126.  Posterior placenta and three-vessel cord noted.  Normal fluid  volume with an VALERIA of 11.1.  Estimated fetal weight 6 pounds 3 ounces which is 32nd percentile.  Of note head measurements are first and 7th percentile.  This is most likely related to positioning.         Relevant Orders    POC Urinalysis Dipstick (Completed)       Other    Positive GBS test    Overview     Treat in labor            Pregnancy at 38w5d  Fetal status reassuring.   Reviewed Pre-eclampsia signs/symptoms  Delivery options reviewed with patient  Signs of labor reviewed  Kick counts reviewed  Activity and Exercise discussed.  Return in about 1 week (around 11/11/2024).    Natasha Harper MD  11/04/2024

## 2024-11-11 ENCOUNTER — ROUTINE PRENATAL (OUTPATIENT)
Dept: OBSTETRICS AND GYNECOLOGY | Facility: CLINIC | Age: 29
End: 2024-11-11
Payer: COMMERCIAL

## 2024-11-11 VITALS — DIASTOLIC BLOOD PRESSURE: 68 MMHG | BODY MASS INDEX: 32.5 KG/M2 | SYSTOLIC BLOOD PRESSURE: 108 MMHG | WEIGHT: 172 LBS

## 2024-11-11 DIAGNOSIS — B95.1 POSITIVE GBS TEST: ICD-10-CM

## 2024-11-11 DIAGNOSIS — Z34.90 PRENATAL CARE, ANTEPARTUM: Primary | ICD-10-CM

## 2024-11-11 DIAGNOSIS — O48.0 POST-TERM PREGNANCY, 40-42 WEEKS OF GESTATION: ICD-10-CM

## 2024-11-11 LAB
GLUCOSE UR STRIP-MCNC: NEGATIVE MG/DL
PROT UR STRIP-MCNC: NEGATIVE MG/DL

## 2024-11-11 PROCEDURE — 0502F SUBSEQUENT PRENATAL CARE: CPT | Performed by: OBSTETRICS & GYNECOLOGY

## 2024-11-11 NOTE — PROGRESS NOTES
OB FOLLOW UP  CC- Here for care of pregnancy        Moises Vasquez is a 29 y.o.  39w5d patient being seen today for her obstetrical follow up visit. Patient reports no complaints.     Pt declines cervix check.     Her prenatal care is complicated by (and status) : see below.  Patient Active Problem List   Diagnosis    Mild intermittent asthma without complication    Prenatal care, antepartum    Positive GBS test       GBS Status:   Group B Strep Culture   Date Value Ref Range Status   10/22/2024 Streptococcus agalactiae (Group B) (A)  Final     Comment:       This organism is considered to be universally susceptible to penicillin.  No further antibiotic testing will be performed. If Clindamycin or Erythromycin is the drug of choice, notify the laboratory within 7 days to request susceptibility testing.         No Known Allergies       Flu Status: Declines  Her Delivery Plan is: Does not desire IOL    US today: no  Non Stress Test: No.    ROS -   Patient Denies: Loss of Fluid, Vaginal Spotting, Vision Changes, Headaches, Nausea , Vomiting , Contractions, Epigastric pain, and skin itching  Fetal Movement : normal  All other systems reviewed and are negative.       The additional following portions of the patient's history were reviewed and updated as appropriate: allergies and current medications.    I have reviewed and agree with the HPI, ROS, and historical information as entered above. Natasha Harper MD        EXAM:     Prenatal Vitals  BP: 108/68  Weight: 78 kg (172 lb)   Fetal Heart Rate: 130   Fundal Height (cm): 39 cm          Urine Glucose Read-only: Negative  Urine Protein Read-only: Negative           Assessment and Plan    Problem List Items Addressed This Visit          Gravid and     Prenatal care, antepartum - Primary    Overview     At 36 weeks and 6 days-  Female infant in cephalic presentation fetal heart rate of 126.  Posterior placenta and three-vessel cord noted.   Normal fluid volume with an VALERIA of 11.1.  Estimated fetal weight 6 pounds 3 ounces which is 32nd percentile.  Of note head measurements are first and 7th percentile.  This is most likely related to positioning.         Relevant Orders    POC Urinalysis Dipstick (Completed)       Other    Positive GBS test    Overview     Treat in labor          Other Visit Diagnoses       Post-term pregnancy, 40-42 weeks of gestation        Relevant Orders    US Ob Follow Up Transabdominal Approach    US Fetal Biophysical Profile;With Non-Stress Testing            Pregnancy at 39w5d  Fetal status reassuring.   Reviewed Pre-eclampsia signs/symptoms  Delivery options reviewed with patient  Signs of labor reviewed  Kick counts reviewed  Activity and Exercise discussed.  Return in about 1 week (around 11/18/2024) for ultrasound, nst.    Natasha Harper MD  11/11/2024

## 2024-11-16 ENCOUNTER — HOSPITAL ENCOUNTER (INPATIENT)
Facility: HOSPITAL | Age: 29
LOS: 3 days | Discharge: HOME | End: 2024-11-19
Attending: OBSTETRICS & GYNECOLOGY | Admitting: OBSTETRICS & GYNECOLOGY
Payer: COMMERCIAL

## 2024-11-16 PROBLEM — O99.820 GBS (GROUP B STREPTOCOCCUS CARRIER), +RV CULTURE, CURRENTLY PREGNANT: Status: ACTIVE | Noted: 2024-10-24

## 2024-11-16 PROBLEM — J45.909 ASTHMA AFFECTING PREGNANCY IN THIRD TRIMESTER: Status: ACTIVE | Noted: 2021-07-22

## 2024-11-16 PROBLEM — Z37.9 NORMAL LABOR: Status: ACTIVE | Noted: 2024-11-16

## 2024-11-16 PROBLEM — O99.513 ASTHMA AFFECTING PREGNANCY IN THIRD TRIMESTER: Status: ACTIVE | Noted: 2021-07-22

## 2024-11-16 LAB
ABO GROUP BLD: NORMAL
ABO GROUP BLD: NORMAL
ALP SERPL-CCNC: 144 U/L (ref 39–117)
ALT SERPL W P-5'-P-CCNC: 8 U/L (ref 1–33)
AMPHET+METHAMPHET UR QL: NEGATIVE
AMPHETAMINES UR QL: NEGATIVE
AST SERPL-CCNC: 13 U/L (ref 1–32)
BARBITURATES UR QL SCN: NEGATIVE
BENZODIAZ UR QL SCN: NEGATIVE
BILIRUB SERPL-MCNC: 0.2 MG/DL (ref 0–1.2)
BLD GP AB SCN SERPL QL: NEGATIVE
BUPRENORPHINE SERPL-MCNC: NEGATIVE NG/ML
CANNABINOIDS SERPL QL: NEGATIVE
COCAINE UR QL: NEGATIVE
CREAT SERPL-MCNC: 0.68 MG/DL (ref 0.57–1)
DEPRECATED RDW RBC AUTO: 43.2 FL (ref 37–54)
ERYTHROCYTE [DISTWIDTH] IN BLOOD BY AUTOMATED COUNT: 12.8 % (ref 12.3–15.4)
FENTANYL UR-MCNC: NEGATIVE NG/ML
HCT VFR BLD AUTO: 40.3 % (ref 34–46.6)
HGB BLD-MCNC: 13.7 G/DL (ref 12–15.9)
LDH SERPL-CCNC: 163 U/L (ref 135–214)
MCH RBC QN AUTO: 31.4 PG (ref 26.6–33)
MCHC RBC AUTO-ENTMCNC: 34 G/DL (ref 31.5–35.7)
MCV RBC AUTO: 92.4 FL (ref 79–97)
METHADONE UR QL SCN: NEGATIVE
OPIATES UR QL: NEGATIVE
OXYCODONE UR QL SCN: NEGATIVE
PCP UR QL SCN: NEGATIVE
PLATELET # BLD AUTO: 242 10*3/MM3 (ref 140–450)
PMV BLD AUTO: 10.1 FL (ref 6–12)
RBC # BLD AUTO: 4.36 10*6/MM3 (ref 3.77–5.28)
RH BLD: POSITIVE
RH BLD: POSITIVE
T&S EXPIRATION DATE: NORMAL
TREPONEMA PALLIDUM IGG+IGM AB [PRESENCE] IN SERUM OR PLASMA BY IMMUNOASSAY: NORMAL
TRICYCLICS UR QL SCN: NEGATIVE
URATE SERPL-MCNC: 4.3 MG/DL (ref 2.4–5.7)
WBC NRBC COR # BLD AUTO: 12.71 10*3/MM3 (ref 3.4–10.8)

## 2024-11-16 PROCEDURE — S0260 H&P FOR SURGERY: HCPCS | Performed by: OBSTETRICS & GYNECOLOGY

## 2024-11-16 PROCEDURE — 86900 BLOOD TYPING SEROLOGIC ABO: CPT

## 2024-11-16 PROCEDURE — 84450 TRANSFERASE (AST) (SGOT): CPT | Performed by: OBSTETRICS & GYNECOLOGY

## 2024-11-16 PROCEDURE — 86901 BLOOD TYPING SEROLOGIC RH(D): CPT | Performed by: OBSTETRICS & GYNECOLOGY

## 2024-11-16 PROCEDURE — 82247 BILIRUBIN TOTAL: CPT | Performed by: OBSTETRICS & GYNECOLOGY

## 2024-11-16 PROCEDURE — 84460 ALANINE AMINO (ALT) (SGPT): CPT | Performed by: OBSTETRICS & GYNECOLOGY

## 2024-11-16 PROCEDURE — 84550 ASSAY OF BLOOD/URIC ACID: CPT | Performed by: OBSTETRICS & GYNECOLOGY

## 2024-11-16 PROCEDURE — 36415 COLL VENOUS BLD VENIPUNCTURE: CPT | Performed by: OBSTETRICS & GYNECOLOGY

## 2024-11-16 PROCEDURE — 84075 ASSAY ALKALINE PHOSPHATASE: CPT | Performed by: OBSTETRICS & GYNECOLOGY

## 2024-11-16 PROCEDURE — 82565 ASSAY OF CREATININE: CPT | Performed by: OBSTETRICS & GYNECOLOGY

## 2024-11-16 PROCEDURE — 85027 COMPLETE CBC AUTOMATED: CPT | Performed by: OBSTETRICS & GYNECOLOGY

## 2024-11-16 PROCEDURE — 86850 RBC ANTIBODY SCREEN: CPT | Performed by: OBSTETRICS & GYNECOLOGY

## 2024-11-16 PROCEDURE — 86900 BLOOD TYPING SEROLOGIC ABO: CPT | Performed by: OBSTETRICS & GYNECOLOGY

## 2024-11-16 PROCEDURE — 83615 LACTATE (LD) (LDH) ENZYME: CPT | Performed by: OBSTETRICS & GYNECOLOGY

## 2024-11-16 PROCEDURE — 86780 TREPONEMA PALLIDUM: CPT | Performed by: OBSTETRICS & GYNECOLOGY

## 2024-11-16 PROCEDURE — 80307 DRUG TEST PRSMV CHEM ANLYZR: CPT | Performed by: OBSTETRICS & GYNECOLOGY

## 2024-11-16 PROCEDURE — 86901 BLOOD TYPING SEROLOGIC RH(D): CPT

## 2024-11-16 RX ORDER — SODIUM CHLORIDE 9 MG/ML
40 INJECTION, SOLUTION INTRAVENOUS AS NEEDED
Status: DISCONTINUED | OUTPATIENT
Start: 2024-11-16 | End: 2024-11-17 | Stop reason: HOSPADM

## 2024-11-16 RX ORDER — ONDANSETRON 2 MG/ML
4 INJECTION INTRAMUSCULAR; INTRAVENOUS EVERY 6 HOURS PRN
OUTPATIENT
Start: 2024-11-16

## 2024-11-16 RX ORDER — ONDANSETRON 4 MG/1
4 TABLET, ORALLY DISINTEGRATING ORAL EVERY 6 HOURS PRN
Status: DISCONTINUED | OUTPATIENT
Start: 2024-11-16 | End: 2024-11-17 | Stop reason: HOSPADM

## 2024-11-16 RX ORDER — OXYTOCIN/0.9 % SODIUM CHLORIDE 30/500 ML
250 PLASTIC BAG, INJECTION (ML) INTRAVENOUS CONTINUOUS
OUTPATIENT
Start: 2024-11-16 | End: 2024-11-16

## 2024-11-16 RX ORDER — CITRIC ACID/SODIUM CITRATE 334-500MG
30 SOLUTION, ORAL ORAL ONCE AS NEEDED
Status: DISCONTINUED | OUTPATIENT
Start: 2024-11-16 | End: 2024-11-17 | Stop reason: HOSPADM

## 2024-11-16 RX ORDER — IBUPROFEN 600 MG/1
600 TABLET, FILM COATED ORAL EVERY 6 HOURS PRN
OUTPATIENT
Start: 2024-11-16

## 2024-11-16 RX ORDER — PENICILLIN G 3000000 [IU]/50ML
3 INJECTION, SOLUTION INTRAVENOUS EVERY 4 HOURS
Status: DISCONTINUED | OUTPATIENT
Start: 2024-11-16 | End: 2024-11-17 | Stop reason: HOSPADM

## 2024-11-16 RX ORDER — ACETAMINOPHEN 325 MG/1
975 TABLET ORAL EVERY 6 HOURS PRN
Status: DISCONTINUED | OUTPATIENT
Start: 2024-11-16 | End: 2024-11-17 | Stop reason: HOSPADM

## 2024-11-16 RX ORDER — FAMOTIDINE 10 MG/ML
20 INJECTION, SOLUTION INTRAVENOUS EVERY 12 HOURS SCHEDULED
Status: DISCONTINUED | OUTPATIENT
Start: 2024-11-16 | End: 2024-11-17 | Stop reason: HOSPADM

## 2024-11-16 RX ORDER — MAGNESIUM CARB/ALUMINUM HYDROX 105-160MG
30 TABLET,CHEWABLE ORAL ONCE
Status: DISCONTINUED | OUTPATIENT
Start: 2024-11-16 | End: 2024-11-17 | Stop reason: HOSPADM

## 2024-11-16 RX ORDER — FAMOTIDINE 20 MG/1
20 TABLET, FILM COATED ORAL EVERY 12 HOURS SCHEDULED
Status: DISCONTINUED | OUTPATIENT
Start: 2024-11-16 | End: 2024-11-17 | Stop reason: HOSPADM

## 2024-11-16 RX ORDER — METHYLERGONOVINE MALEATE 0.2 MG/ML
200 INJECTION INTRAVENOUS ONCE AS NEEDED
OUTPATIENT
Start: 2024-11-16

## 2024-11-16 RX ORDER — ACETAMINOPHEN 325 MG/1
650 TABLET ORAL EVERY 4 HOURS PRN
OUTPATIENT
Start: 2024-11-16

## 2024-11-16 RX ORDER — ONDANSETRON 4 MG/1
4 TABLET, ORALLY DISINTEGRATING ORAL EVERY 6 HOURS PRN
OUTPATIENT
Start: 2024-11-16

## 2024-11-16 RX ORDER — SODIUM CHLORIDE, SODIUM LACTATE, POTASSIUM CHLORIDE, CALCIUM CHLORIDE 600; 310; 30; 20 MG/100ML; MG/100ML; MG/100ML; MG/100ML
125 INJECTION, SOLUTION INTRAVENOUS CONTINUOUS
Status: DISCONTINUED | OUTPATIENT
Start: 2024-11-16 | End: 2024-11-17

## 2024-11-16 RX ORDER — OXYTOCIN/0.9 % SODIUM CHLORIDE 30/500 ML
999 PLASTIC BAG, INJECTION (ML) INTRAVENOUS ONCE
OUTPATIENT
Start: 2024-11-16 | End: 2024-11-16

## 2024-11-16 RX ORDER — ONDANSETRON 2 MG/ML
4 INJECTION INTRAMUSCULAR; INTRAVENOUS EVERY 6 HOURS PRN
Status: DISCONTINUED | OUTPATIENT
Start: 2024-11-16 | End: 2024-11-17 | Stop reason: HOSPADM

## 2024-11-16 RX ORDER — LIDOCAINE HYDROCHLORIDE 10 MG/ML
0.5 INJECTION, SOLUTION EPIDURAL; INFILTRATION; INTRACAUDAL; PERINEURAL ONCE AS NEEDED
Status: DISCONTINUED | OUTPATIENT
Start: 2024-11-16 | End: 2024-11-17 | Stop reason: HOSPADM

## 2024-11-16 RX ORDER — TERBUTALINE SULFATE 1 MG/ML
0.25 INJECTION, SOLUTION SUBCUTANEOUS AS NEEDED
Status: DISCONTINUED | OUTPATIENT
Start: 2024-11-16 | End: 2024-11-17 | Stop reason: HOSPADM

## 2024-11-16 RX ORDER — SODIUM CHLORIDE 0.9 % (FLUSH) 0.9 %
10 SYRINGE (ML) INJECTION EVERY 12 HOURS SCHEDULED
Status: DISCONTINUED | OUTPATIENT
Start: 2024-11-16 | End: 2024-11-17 | Stop reason: HOSPADM

## 2024-11-16 RX ORDER — CARBOPROST TROMETHAMINE 250 UG/ML
250 INJECTION, SOLUTION INTRAMUSCULAR
OUTPATIENT
Start: 2024-11-16

## 2024-11-16 RX ORDER — SODIUM CHLORIDE 0.9 % (FLUSH) 0.9 %
10 SYRINGE (ML) INJECTION AS NEEDED
Status: DISCONTINUED | OUTPATIENT
Start: 2024-11-16 | End: 2024-11-17 | Stop reason: HOSPADM

## 2024-11-16 RX ORDER — MISOPROSTOL 200 UG/1
800 TABLET ORAL ONCE AS NEEDED
OUTPATIENT
Start: 2024-11-16

## 2024-11-16 RX ORDER — OXYTOCIN 10 [USP'U]/ML
INJECTION, SOLUTION INTRAMUSCULAR; INTRAVENOUS
Status: DISCONTINUED
Start: 2024-11-16 | End: 2024-11-19 | Stop reason: HOSPADM

## 2024-11-16 NOTE — H&P
[facility]  Obstetric History and Physical    Chief Complaint   Patient presents with    Contractions       Subjective     Patient is a 29 y.o. female  currently at 40w3d, who presents with contractions she is 5 cm on arrival.  Her membranes are intact.  Her pregnancy has been uncomplicated other than GBS positivity..    Her prenatal care is benign.  Her previous obstetric/gynecological history is noted for is non-contributory.    The following portions of the patients history were reviewed and updated as appropriate: current medications, allergies, past medical history, past surgical history, and problem list .       Prenatal Information:  Prenatal Results       Initial Prenatal Labs       Test Value Reference Range Date Time    Hemoglobin  13.1 g/dL 11.1 - 15.9 24 1427    Hematocrit  39.0 % 34.0 - 46.6 24 1427    Platelets  309 x10E3/uL 150 - 450 24 1427    Rubella IgG  2.61 index Immune >0.99 24 1427    Hepatitis B SAg  Negative  Negative 24 1427    Hepatitis C Ab  Non Reactive  Non Reactive 24 1427    RPR  Non Reactive  Non Reactive 24 0946       Non Reactive  Non Reactive 24 1427    T. Pallidum Ab         ABO  A   24 1427    Rh  Positive   24 1427    Antibody Screen  Negative  Negative 24 1427    HIV  Non Reactive  Non Reactive 24 1427    Urine Culture  Final report   24 1427    Gonorrhea  Negative  Negative 24 1427    Chlamydia  Negative  Negative 24 1427    TSH        HgB A1c         Varicella IgG        Hemoglobinopathy Fractionation        Hemoglobinopathy (genetic testing)        Cystic fibrosis         Spinal muscular atrophy        Fragile X                  Fetal testing        Test Value Reference Range Date Time    NIPT        MSAFP        AFP-4                  2nd and 3rd Trimester       Test Value Reference Range Date Time    Hemoglobin (repeated)  13.7 g/dL 12.0 - 15.9 24 1502       12.0 g/dL 12.0  - 15.9 08/27/24 0946    Hematocrit (repeated)  40.3 % 34.0 - 46.6 11/16/24 1502       36.0 % 34.0 - 46.6 08/27/24 0946    Platelets   242 10*3/mm3 140 - 450 11/16/24 1502       297 10*3/mm3 140 - 450 08/27/24 0946       309 x10E3/uL 150 - 450 04/12/24 1427    1 hour GTT   103 mg/dL 65 - 139 08/27/24 0946    Antibody Screen (repeated)  Negative  Negative 08/27/24 0946    3rd TM syphilis scrn (repeated)  RPR   Non Reactive  Non Reactive 08/27/24 0946    3rd TM syphilis scrn (repeated) TP-Ab        3rd TM syphilis screen TB-Ab (FTA)        Syphilis cascade test TP-Ab (EIA)        Syphilis cascade TPPA        GTT Fasting        GTT 1 Hr        GTT 2 Hr        GTT 3 Hr        Group B Strep  Streptococcus agalactiae (Group B)   10/22/24 1833              Other testing        Test Value Reference Range Date Time    Parvo IgG         CMV IgG                   Drug Screening       Test Value Reference Range Date Time    Amphetamine Screen  Negative  Negative 11/16/24 1523    Barbiturate Screen  Negative  Negative 11/16/24 1523    Benzodiazepine Screen  Negative  Negative 11/16/24 1523    Methadone Screen  Negative  Negative 11/16/24 1523    Phencyclidine Screen  Negative  Negative 11/16/24 1523    Opiates Screen  Negative  Negative 11/16/24 1523    THC Screen  Negative  Negative 11/16/24 1523    Cocaine Screen  Negative  Negative 11/16/24 1523    Propoxyphene Screen        Buprenorphine Screen  Negative  Negative 11/16/24 1523    Methamphetamine Screen  Negative  Negative 11/16/24 1523    Oxycodone Screen  Negative  Negative 11/16/24 1523    Tricyclic Antidepressants Screen  Negative  Negative 11/16/24 1523              Legend    ^: Historical                          External Prenatal Results       Pregnancy Outside Results - Transcribed From Office Records - See Scanned Records For Details       Test Value Date Time    ABO  A  04/12/24 1427    Rh  Positive  04/12/24 1427    Antibody Screen  Negative  08/27/24 0946        Negative  24 1427    Varicella IgG       Rubella  2.61 index 24 1427    Hgb  13.7 g/dL 24 1502       12.0 g/dL 24 0946       13.1 g/dL 24 1427    Hct  40.3 % 24 1502       36.0 % 24 0946       39.0 % 24 1427    HgB A1c        1h GTT  103 mg/dL 24 0946    3h GTT Fasting       3h GTT 1 hour       3h GTT 2 hour       3h GTT 3 hour        Gonorrhea (discrete)  Negative  24 1427    Chlamydia (discrete)  Negative  24 1427    RPR  Non Reactive  24 0946       Non Reactive  24 142    Syphils cascade: TP-Ab (FTA)       TP-Ab       TP-Ab (EIA)       TPPA       HBsAg  Negative  24 142    Herpes Simplex Virus PCR       Herpes Simplex VIrus Culture       HIV  Non Reactive  24 1427    Hep C RNA Quant PCR       Hep C Antibody  Non Reactive  24 1427    AFP       NIPT       Cystic Fibrosis (Emma)       Cystic Fibroisis        Spinal Muscular atrophy       Fragile X       Group B Strep  Streptococcus agalactiae (Group B)  10/22/24 1833    GBS Susceptibility to Clindamycin       GBS Susceptibility to Erythromycin       Fetal Fibronectin       Genetic Testing, Maternal Blood                 Drug Screening       Test Value Date Time    Urine Drug Screen       Amphetamine Screen  Negative  24 1523    Barbiturate Screen  Negative  24 1523    Benzodiazepine Screen  Negative  24 1523    Methadone Screen  Negative  24 1523    Phencyclidine Screen  Negative  24 1523    Opiates Screen  Negative  24 1523    THC Screen  Negative  24 1523    Cocaine Screen       Propoxyphene Screen       Buprenorphine Screen  Negative  24 1523    Methamphetamine Screen       Oxycodone Screen  Negative  24 1523    Tricyclic Antidepressants Screen  Negative  24 1523              Legend    ^: Historical                             Past OB History:     OB History    Para Term  AB Living   1 0 0 0  0 0   SAB IAB Ectopic Molar Multiple Live Births   0 0 0 0 0 0      # Outcome Date GA Lbr Umesh/2nd Weight Sex Type Anes PTL Lv   1 Current                Past Medical History: Past Medical History:   Diagnosis Date    Allergic     Asthma       Past Surgical History History reviewed. No pertinent surgical history.   Family History: Family History   Problem Relation Age of Onset    Heart disease Mother     Melanoma Maternal Grandmother     Diabetes Maternal Grandmother     Heart disease Maternal Grandmother     Asthma Maternal Grandmother     Colon cancer Paternal Grandfather       Social History:  reports that she has never smoked. She has never used smokeless tobacco.   reports that she does not currently use alcohol.   reports no history of drug use.        General ROS: Pertinent items are noted in HPI    Objective       Vital Signs Range for the last 24 hours  Temperature: Temp:  [97.8 °F (36.6 °C)] 97.8 °F (36.6 °C)   Temp Source: Temp src: Oral   BP: BP: (111)/(71) 111/71   Pulse: Heart Rate:  [73] 73   Respirations: Resp:  [16] 16   SPO2:     O2 Amount (l/min):     O2 Devices     Weight:       Physical Examination: 5/90/-1 per RN    Presentation:          Fetal Heart Rate Assessment   Method:     Beats/min:     Baseline:     Variability:     Accels:     Decels:     Tracing Category:       Uterine Assessment   Method: Method: palpation   Frequency (min):     Ctx Count in 10 min:     Duration:     Intensity: Contraction Intensity: moderate by palpation   Intensity by IUPC:     Resting Tone: Uterine Resting Tone: soft by palpation   Resting Tone by IUPC:     [place] Units:       Cervical Exam:    Exam by: Method: sterile vaginal exam performed (per RN) (11/16/24 1447)  Cervical Dilation (cm): 5  Cervical Effacement: 90   Fetal Station: -1       Group B Strep Culture   Date Value Ref Range Status   10/22/2024 Streptococcus agalactiae (Group B) (A)  Final     Comment:       This organism is considered to be  "universally susceptible to penicillin.  No further antibiotic testing will be performed. If Clindamycin or Erythromycin is the drug of choice, notify the laboratory within 7 days to request susceptibility testing.       Assessment & Plan       Normal labor    Asthma affecting pregnancy in third trimester    GBS (group B Streptococcus carrier), +RV culture, currently pregnant        Assessment:  1.  Intrauterine pregnancy at 40w3d weeks gestation with Category 1 fetal status.    2.  Labor  3.  Obstetrical history significant for is non-contributory.  4.  GBS status: positive (see below)       Plan:  1.  Birth plan reviewed with patient and her partner.  Okay for intermittent monitoring.  Desires no interventions  2.  Extensive conversation with the patient, and her partner Real, about the risks of declining GBS prophylaxis with penicillin.  They report that they have \"extensively discussed this\".  I explained that as the delivering provider, I need to make sure that I have had this discussion with them.  We discussed that the rationale behind GBS prophylaxis to prevent GBS septicemia for the baby.  We discussed that GBS can cause bacterial meningitis, blood-borne infections, and even  death.  We discussed that there may be other risks to the fetus that I have not mentioned.  They report they are concerned about the microbiome of the baby being affected by penicillin.  We discussed the penicillin is a relatively narrow spectrum antibiotic that primarily has activity against gram-positive bacteria such as GBS.  Regardless, Real and Moises decline penicillin prophylaxis during labor.  I had Moises sign a refusal to treat for acknowledging that we discussed these risks.   3.  Risks, benefits of treatment plan have been discussed.  4.  All questions have been answered.      Ben Hunt MD  2024  16:26 EST    "

## 2024-11-17 ENCOUNTER — ANESTHESIA EVENT (OUTPATIENT)
Dept: LABOR AND DELIVERY | Facility: HOSPITAL | Age: 29
End: 2024-11-17
Payer: COMMERCIAL

## 2024-11-17 ENCOUNTER — ANESTHESIA (OUTPATIENT)
Dept: LABOR AND DELIVERY | Facility: HOSPITAL | Age: 29
End: 2024-11-17
Payer: COMMERCIAL

## 2024-11-17 LAB
ATMOSPHERIC PRESS: ABNORMAL MM[HG]
ATMOSPHERIC PRESS: ABNORMAL MM[HG]
BASE EXCESS BLDCOA CALC-SCNC: 0.9 MMOL/L (ref 0–2)
BASE EXCESS BLDCOV CALC-SCNC: 0 MMOL/L (ref 0–2)
BDY SITE: ABNORMAL
BDY SITE: ABNORMAL
BODY TEMPERATURE: 37
BODY TEMPERATURE: 37
CO2 BLDA-SCNC: 25.8 MMOL/L (ref 22–33)
CO2 BLDA-SCNC: 29.6 MMOL/L (ref 22–33)
EPAP: 0
EPAP: 0
HCO3 BLDCOA-SCNC: 28 MMOL/L (ref 16.9–20.5)
HCO3 BLDCOV-SCNC: 24.6 MMOL/L (ref 18.6–21.4)
HGB BLDA-MCNC: 17.3 G/DL (ref 14–18)
HGB BLDA-MCNC: 17.5 G/DL (ref 14–18)
INHALED O2 CONCENTRATION: 21 %
INHALED O2 CONCENTRATION: 21 %
IPAP: 0
IPAP: 0
MODALITY: ABNORMAL
MODALITY: ABNORMAL
NOTE: 0
PAW @ PEAK INSP FLOW SETTING VENT: 0 CMH2O
PAW @ PEAK INSP FLOW SETTING VENT: 0 CMH2O
PCO2 BLDCOA: 52 MMHG (ref 43.3–54.9)
PCO2 BLDCOV: 39.1 MM HG (ref 28–40)
PH BLDCOA: 7.34 PH UNITS (ref 7.22–7.3)
PH BLDCOV: 7.41 PH UNITS (ref 7.31–7.37)
PO2 BLDCOA: 12.5 MMHG (ref 11.5–43.3)
PO2 BLDCOV: 20.8 MM HG (ref 21–31)
SAO2 % BLDCOA: 17.8 %
SAO2 % BLDCOA: ABNORMAL %
SAO2 % BLDCOV: 47.9 %
TOTAL RATE: 0 BREATHS/MINUTE
TOTAL RATE: 0 BREATHS/MINUTE
VENTILATOR MODE: ABNORMAL

## 2024-11-17 PROCEDURE — 25810000003 LACTATED RINGERS SOLUTION: Performed by: OBSTETRICS & GYNECOLOGY

## 2024-11-17 PROCEDURE — 25810000003 LACTATED RINGERS PER 1000 ML: Performed by: ANESTHESIOLOGY

## 2024-11-17 PROCEDURE — 25010000002 AZITHROMYCIN PER 500 MG: Performed by: OBSTETRICS & GYNECOLOGY

## 2024-11-17 PROCEDURE — 25010000002 MORPHINE PER 10 MG: Performed by: ANESTHESIOLOGY

## 2024-11-17 PROCEDURE — 88307 TISSUE EXAM BY PATHOLOGIST: CPT | Performed by: OBSTETRICS & GYNECOLOGY

## 2024-11-17 PROCEDURE — 25010000002 LIDOCAINE-EPINEPHRINE (PF) 1.5 %-1:200000 SOLUTION: Performed by: ANESTHESIOLOGY

## 2024-11-17 PROCEDURE — 59025 FETAL NON-STRESS TEST: CPT

## 2024-11-17 PROCEDURE — 51703 INSERT BLADDER CATH COMPLEX: CPT

## 2024-11-17 PROCEDURE — 82805 BLOOD GASES W/O2 SATURATION: CPT

## 2024-11-17 PROCEDURE — 59510 CESAREAN DELIVERY: CPT | Performed by: OBSTETRICS & GYNECOLOGY

## 2024-11-17 PROCEDURE — 25010000002 ONDANSETRON PER 1 MG: Performed by: ANESTHESIOLOGY

## 2024-11-17 PROCEDURE — 25010000002 KETOROLAC TROMETHAMINE PER 15 MG: Performed by: OBSTETRICS & GYNECOLOGY

## 2024-11-17 PROCEDURE — 59514 CESAREAN DELIVERY ONLY: CPT | Performed by: OBSTETRICS & GYNECOLOGY

## 2024-11-17 PROCEDURE — 25010000002 LIDOCAINE-EPINEPHRINE (PF) 2 %-1:200000 SOLUTION: Performed by: ANESTHESIOLOGY

## 2024-11-17 PROCEDURE — 25810000003 SODIUM CHLORIDE 0.9 % SOLUTION 250 ML FLEX CONT: Performed by: OBSTETRICS & GYNECOLOGY

## 2024-11-17 PROCEDURE — 25810000003 LACTATED RINGERS PER 1000 ML: Performed by: OBSTETRICS & GYNECOLOGY

## 2024-11-17 PROCEDURE — C1755 CATHETER, INTRASPINAL: HCPCS | Performed by: ANESTHESIOLOGY

## 2024-11-17 PROCEDURE — C1755 CATHETER, INTRASPINAL: HCPCS

## 2024-11-17 PROCEDURE — 25010000002 METHYLERGONOVINE MALEATE PER 0.2 MG: Performed by: ANESTHESIOLOGY

## 2024-11-17 PROCEDURE — 25010000002 FENTANYL CITRATE (PF) 50 MCG/ML SOLUTION: Performed by: ANESTHESIOLOGY

## 2024-11-17 PROCEDURE — 25010000002 CEFAZOLIN PER 500 MG: Performed by: OBSTETRICS & GYNECOLOGY

## 2024-11-17 PROCEDURE — 25010000002 BUPIVACAINE (PF) 0.25 % SOLUTION: Performed by: ANESTHESIOLOGY

## 2024-11-17 PROCEDURE — 25010000002 ROPIVACAINE PER 1 MG: Performed by: ANESTHESIOLOGY

## 2024-11-17 RX ORDER — POLYETHYLENE GLYCOL 3350 17 G/17G
17 POWDER, FOR SOLUTION ORAL DAILY
Status: DISCONTINUED | OUTPATIENT
Start: 2024-11-17 | End: 2024-11-19 | Stop reason: HOSPADM

## 2024-11-17 RX ORDER — OXYTOCIN/0.9 % SODIUM CHLORIDE 30/500 ML
250 PLASTIC BAG, INJECTION (ML) INTRAVENOUS CONTINUOUS
Status: ACTIVE | OUTPATIENT
Start: 2024-11-17 | End: 2024-11-17

## 2024-11-17 RX ORDER — MISOPROSTOL 200 UG/1
600 TABLET ORAL AS NEEDED
Status: DISCONTINUED | OUTPATIENT
Start: 2024-11-17 | End: 2024-11-19 | Stop reason: HOSPADM

## 2024-11-17 RX ORDER — MORPHINE SULFATE 0.5 MG/ML
INJECTION, SOLUTION EPIDURAL; INTRATHECAL; INTRAVENOUS AS NEEDED
Status: DISCONTINUED | OUTPATIENT
Start: 2024-11-17 | End: 2024-11-17 | Stop reason: SURG

## 2024-11-17 RX ORDER — FAMOTIDINE 10 MG/ML
INJECTION, SOLUTION INTRAVENOUS AS NEEDED
Status: DISCONTINUED | OUTPATIENT
Start: 2024-11-17 | End: 2024-11-17 | Stop reason: SURG

## 2024-11-17 RX ORDER — FENTANYL CITRATE 50 UG/ML
INJECTION, SOLUTION INTRAMUSCULAR; INTRAVENOUS AS NEEDED
Status: DISCONTINUED | OUTPATIENT
Start: 2024-11-17 | End: 2024-11-17 | Stop reason: SURG

## 2024-11-17 RX ORDER — IBUPROFEN 600 MG/1
600 TABLET, FILM COATED ORAL EVERY 6 HOURS
Status: DISCONTINUED | OUTPATIENT
Start: 2024-11-19 | End: 2024-11-19 | Stop reason: HOSPADM

## 2024-11-17 RX ORDER — BUPIVACAINE HYDROCHLORIDE 2.5 MG/ML
INJECTION, SOLUTION EPIDURAL; INFILTRATION; INTRACAUDAL AS NEEDED
Status: DISCONTINUED | OUTPATIENT
Start: 2024-11-17 | End: 2024-11-17 | Stop reason: SURG

## 2024-11-17 RX ORDER — METHYLERGONOVINE MALEATE 0.2 MG/ML
INJECTION INTRAVENOUS AS NEEDED
Status: DISCONTINUED | OUTPATIENT
Start: 2024-11-17 | End: 2024-11-17 | Stop reason: SURG

## 2024-11-17 RX ORDER — AMOXICILLIN 250 MG
1 CAPSULE ORAL 2 TIMES DAILY
Status: DISCONTINUED | OUTPATIENT
Start: 2024-11-17 | End: 2024-11-19 | Stop reason: HOSPADM

## 2024-11-17 RX ORDER — SIMETHICONE 80 MG
80 TABLET,CHEWABLE ORAL 4 TIMES DAILY PRN
Status: DISCONTINUED | OUTPATIENT
Start: 2024-11-17 | End: 2024-11-19 | Stop reason: HOSPADM

## 2024-11-17 RX ORDER — ONDANSETRON 2 MG/ML
4 INJECTION INTRAMUSCULAR; INTRAVENOUS EVERY 6 HOURS PRN
Status: DISCONTINUED | OUTPATIENT
Start: 2024-11-17 | End: 2024-11-19 | Stop reason: HOSPADM

## 2024-11-17 RX ORDER — ACETAMINOPHEN 500 MG
1000 TABLET ORAL ONCE
Status: COMPLETED | OUTPATIENT
Start: 2024-11-17 | End: 2024-11-17

## 2024-11-17 RX ORDER — OXYTOCIN/0.9 % SODIUM CHLORIDE 30/500 ML
999 PLASTIC BAG, INJECTION (ML) INTRAVENOUS ONCE
Status: DISCONTINUED | OUTPATIENT
Start: 2024-11-17 | End: 2024-11-17 | Stop reason: HOSPADM

## 2024-11-17 RX ORDER — METHYLERGONOVINE MALEATE 0.2 MG/ML
200 INJECTION INTRAVENOUS AS NEEDED
Status: DISCONTINUED | OUTPATIENT
Start: 2024-11-17 | End: 2024-11-19 | Stop reason: HOSPADM

## 2024-11-17 RX ORDER — CALCIUM CARBONATE 500 MG/1
1 TABLET, CHEWABLE ORAL EVERY 4 HOURS PRN
Status: DISCONTINUED | OUTPATIENT
Start: 2024-11-17 | End: 2024-11-19 | Stop reason: HOSPADM

## 2024-11-17 RX ORDER — ROPIVACAINE HYDROCHLORIDE 2 MG/ML
15 INJECTION, SOLUTION EPIDURAL; INFILTRATION; PERINEURAL CONTINUOUS
Status: DISCONTINUED | OUTPATIENT
Start: 2024-11-17 | End: 2024-11-17

## 2024-11-17 RX ORDER — SODIUM CHLORIDE 9 MG/ML
INJECTION, SOLUTION INTRAVENOUS
Status: COMPLETED
Start: 2024-11-17 | End: 2024-11-17

## 2024-11-17 RX ORDER — OXYCODONE HYDROCHLORIDE 10 MG/1
10 TABLET ORAL EVERY 4 HOURS PRN
Status: DISCONTINUED | OUTPATIENT
Start: 2024-11-17 | End: 2024-11-19 | Stop reason: HOSPADM

## 2024-11-17 RX ORDER — FAMOTIDINE 10 MG/ML
20 INJECTION, SOLUTION INTRAVENOUS ONCE AS NEEDED
Status: DISCONTINUED | OUTPATIENT
Start: 2024-11-17 | End: 2024-11-17 | Stop reason: HOSPADM

## 2024-11-17 RX ORDER — HYDROCORTISONE 25 MG/G
1 CREAM TOPICAL AS NEEDED
Status: DISCONTINUED | OUTPATIENT
Start: 2024-11-17 | End: 2024-11-19 | Stop reason: HOSPADM

## 2024-11-17 RX ORDER — LIDOCAINE HYDROCHLORIDE AND EPINEPHRINE 15; 5 MG/ML; UG/ML
INJECTION, SOLUTION EPIDURAL AS NEEDED
Status: DISCONTINUED | OUTPATIENT
Start: 2024-11-17 | End: 2024-11-17 | Stop reason: SURG

## 2024-11-17 RX ORDER — CARBOPROST TROMETHAMINE 250 UG/ML
250 INJECTION, SOLUTION INTRAMUSCULAR AS NEEDED
Status: DISCONTINUED | OUTPATIENT
Start: 2024-11-17 | End: 2024-11-19 | Stop reason: HOSPADM

## 2024-11-17 RX ORDER — ACETAMINOPHEN 325 MG/1
650 TABLET ORAL EVERY 6 HOURS
Status: DISCONTINUED | OUTPATIENT
Start: 2024-11-18 | End: 2024-11-19 | Stop reason: HOSPADM

## 2024-11-17 RX ORDER — SODIUM CHLORIDE, SODIUM LACTATE, POTASSIUM CHLORIDE, CALCIUM CHLORIDE 600; 310; 30; 20 MG/100ML; MG/100ML; MG/100ML; MG/100ML
INJECTION, SOLUTION INTRAVENOUS CONTINUOUS PRN
Status: DISCONTINUED | OUTPATIENT
Start: 2024-11-17 | End: 2024-11-17 | Stop reason: SURG

## 2024-11-17 RX ORDER — HYDROXYZINE HYDROCHLORIDE 25 MG/1
50 TABLET, FILM COATED ORAL EVERY 6 HOURS PRN
Status: DISCONTINUED | OUTPATIENT
Start: 2024-11-17 | End: 2024-11-19 | Stop reason: HOSPADM

## 2024-11-17 RX ORDER — ALUMINA, MAGNESIA, AND SIMETHICONE 2400; 2400; 240 MG/30ML; MG/30ML; MG/30ML
15 SUSPENSION ORAL EVERY 4 HOURS PRN
Status: DISCONTINUED | OUTPATIENT
Start: 2024-11-17 | End: 2024-11-19 | Stop reason: HOSPADM

## 2024-11-17 RX ORDER — FENTANYL CITRATE 50 UG/ML
50 INJECTION, SOLUTION INTRAMUSCULAR; INTRAVENOUS
Status: DISCONTINUED | OUTPATIENT
Start: 2024-11-17 | End: 2024-11-17 | Stop reason: HOSPADM

## 2024-11-17 RX ORDER — LIDOCAINE HCL/EPINEPHRINE/PF 2%-1:200K
VIAL (ML) INJECTION AS NEEDED
Status: DISCONTINUED | OUTPATIENT
Start: 2024-11-17 | End: 2024-11-17 | Stop reason: SURG

## 2024-11-17 RX ORDER — PRENATAL VIT/IRON FUM/FOLIC AC 27MG-0.8MG
1 TABLET ORAL DAILY
Status: DISCONTINUED | OUTPATIENT
Start: 2024-11-17 | End: 2024-11-19 | Stop reason: HOSPADM

## 2024-11-17 RX ORDER — ACETAMINOPHEN 500 MG
1000 TABLET ORAL EVERY 6 HOURS
Status: COMPLETED | OUTPATIENT
Start: 2024-11-17 | End: 2024-11-18

## 2024-11-17 RX ORDER — KETOROLAC TROMETHAMINE 15 MG/ML
15 INJECTION, SOLUTION INTRAMUSCULAR; INTRAVENOUS EVERY 6 HOURS
Status: COMPLETED | OUTPATIENT
Start: 2024-11-17 | End: 2024-11-19

## 2024-11-17 RX ORDER — OXYTOCIN/0.9 % SODIUM CHLORIDE 30/500 ML
125 PLASTIC BAG, INJECTION (ML) INTRAVENOUS CONTINUOUS PRN
Status: DISCONTINUED | OUTPATIENT
Start: 2024-11-17 | End: 2024-11-19 | Stop reason: HOSPADM

## 2024-11-17 RX ORDER — OXYTOCIN/0.9 % SODIUM CHLORIDE 30/500 ML
PLASTIC BAG, INJECTION (ML) INTRAVENOUS AS NEEDED
Status: DISCONTINUED | OUTPATIENT
Start: 2024-11-17 | End: 2024-11-17 | Stop reason: SURG

## 2024-11-17 RX ORDER — EPHEDRINE SULFATE 5 MG/ML
10 INJECTION INTRAVENOUS
Status: DISCONTINUED | OUTPATIENT
Start: 2024-11-17 | End: 2024-11-17 | Stop reason: HOSPADM

## 2024-11-17 RX ORDER — SODIUM CHLORIDE 9 MG/ML
INJECTION, SOLUTION INTRAVENOUS
Status: DISCONTINUED
Start: 2024-11-17 | End: 2024-11-19 | Stop reason: HOSPADM

## 2024-11-17 RX ORDER — METOCLOPRAMIDE HYDROCHLORIDE 5 MG/ML
10 INJECTION INTRAMUSCULAR; INTRAVENOUS ONCE AS NEEDED
Status: DISCONTINUED | OUTPATIENT
Start: 2024-11-17 | End: 2024-11-17 | Stop reason: HOSPADM

## 2024-11-17 RX ORDER — ENOXAPARIN SODIUM 100 MG/ML
40 INJECTION SUBCUTANEOUS NIGHTLY
Status: DISCONTINUED | OUTPATIENT
Start: 2024-11-18 | End: 2024-11-19 | Stop reason: HOSPADM

## 2024-11-17 RX ORDER — AZITHROMYCIN 500 MG/1
INJECTION, POWDER, LYOPHILIZED, FOR SOLUTION INTRAVENOUS
Status: DISCONTINUED
Start: 2024-11-17 | End: 2024-11-19 | Stop reason: HOSPADM

## 2024-11-17 RX ORDER — CITRIC ACID/SODIUM CITRATE 334-500MG
30 SOLUTION, ORAL ORAL ONCE
Status: COMPLETED | OUTPATIENT
Start: 2024-11-17 | End: 2024-11-17

## 2024-11-17 RX ORDER — KETOROLAC TROMETHAMINE 30 MG/ML
30 INJECTION, SOLUTION INTRAMUSCULAR; INTRAVENOUS ONCE
Status: COMPLETED | OUTPATIENT
Start: 2024-11-17 | End: 2024-11-17

## 2024-11-17 RX ORDER — DIPHENHYDRAMINE HYDROCHLORIDE 50 MG/ML
12.5 INJECTION INTRAMUSCULAR; INTRAVENOUS EVERY 8 HOURS PRN
Status: DISCONTINUED | OUTPATIENT
Start: 2024-11-17 | End: 2024-11-17 | Stop reason: HOSPADM

## 2024-11-17 RX ORDER — ONDANSETRON 2 MG/ML
INJECTION INTRAMUSCULAR; INTRAVENOUS AS NEEDED
Status: DISCONTINUED | OUTPATIENT
Start: 2024-11-17 | End: 2024-11-17 | Stop reason: SURG

## 2024-11-17 RX ORDER — OXYCODONE HYDROCHLORIDE 5 MG/1
5 TABLET ORAL EVERY 4 HOURS PRN
Status: DISCONTINUED | OUTPATIENT
Start: 2024-11-17 | End: 2024-11-19 | Stop reason: HOSPADM

## 2024-11-17 RX ORDER — CITRIC ACID/SODIUM CITRATE 334-500MG
30 SOLUTION, ORAL ORAL ONCE
Status: DISCONTINUED | OUTPATIENT
Start: 2024-11-17 | End: 2024-11-17 | Stop reason: HOSPADM

## 2024-11-17 RX ORDER — ONDANSETRON 4 MG/1
4 TABLET, ORALLY DISINTEGRATING ORAL EVERY 6 HOURS PRN
Status: DISCONTINUED | OUTPATIENT
Start: 2024-11-17 | End: 2024-11-19 | Stop reason: HOSPADM

## 2024-11-17 RX ADMIN — FAMOTIDINE 20 MG: 10 INJECTION, SOLUTION INTRAVENOUS at 12:05

## 2024-11-17 RX ADMIN — METHYLERGONOVINE MALEATE 200 MCG: 0.2 INJECTION, SOLUTION INTRAMUSCULAR; INTRAVENOUS at 12:26

## 2024-11-17 RX ADMIN — ACETAMINOPHEN 1000 MG: 500 TABLET ORAL at 17:28

## 2024-11-17 RX ADMIN — Medication 500 ML: at 12:22

## 2024-11-17 RX ADMIN — SODIUM CHLORIDE, POTASSIUM CHLORIDE, SODIUM LACTATE AND CALCIUM CHLORIDE 1000 ML: 600; 310; 30; 20 INJECTION, SOLUTION INTRAVENOUS at 09:35

## 2024-11-17 RX ADMIN — SODIUM CHLORIDE, POTASSIUM CHLORIDE, SODIUM LACTATE AND CALCIUM CHLORIDE: 600; 310; 30; 20 INJECTION, SOLUTION INTRAVENOUS at 12:03

## 2024-11-17 RX ADMIN — SODIUM CHLORIDE, POTASSIUM CHLORIDE, SODIUM LACTATE AND CALCIUM CHLORIDE 125 ML/HR: 600; 310; 30; 20 INJECTION, SOLUTION INTRAVENOUS at 10:41

## 2024-11-17 RX ADMIN — MORPHINE SULFATE 1 MG: 0.5 INJECTION, SOLUTION EPIDURAL; INTRATHECAL; INTRAVENOUS at 12:29

## 2024-11-17 RX ADMIN — ONDANSETRON 4 MG: 2 INJECTION INTRAMUSCULAR; INTRAVENOUS at 12:05

## 2024-11-17 RX ADMIN — SODIUM CHLORIDE 40 ML: 9 INJECTION, SOLUTION INTRAVENOUS at 11:49

## 2024-11-17 RX ADMIN — AZITHROMYCIN DIHYDRATE 500 MG: 500 INJECTION, POWDER, LYOPHILIZED, FOR SOLUTION INTRAVENOUS at 14:58

## 2024-11-17 RX ADMIN — KETOROLAC TROMETHAMINE 15 MG: 15 INJECTION, SOLUTION INTRAMUSCULAR; INTRAVENOUS at 20:46

## 2024-11-17 RX ADMIN — FENTANYL CITRATE 100 MCG: 50 INJECTION, SOLUTION INTRAMUSCULAR; INTRAVENOUS at 10:04

## 2024-11-17 RX ADMIN — SODIUM CHLORIDE 40 ML: 900 INJECTION INTRAVENOUS at 11:49

## 2024-11-17 RX ADMIN — ROPIVACAINE HYDROCHLORIDE 10 ML/HR: 2 INJECTION, SOLUTION EPIDURAL; INFILTRATION at 10:08

## 2024-11-17 RX ADMIN — LIDOCAINE HYDROCHLORIDE AND EPINEPHRINE 3 ML: 15; 5 INJECTION, SOLUTION EPIDURAL at 10:02

## 2024-11-17 RX ADMIN — LIDOCAINE HYDROCHLORIDE AND EPINEPHRINE 2 ML: 15; 5 INJECTION, SOLUTION EPIDURAL at 10:03

## 2024-11-17 RX ADMIN — SENNOSIDES AND DOCUSATE SODIUM 1 TABLET: 50; 8.6 TABLET ORAL at 20:46

## 2024-11-17 RX ADMIN — MORPHINE SULFATE 4 MG: 0.5 INJECTION, SOLUTION EPIDURAL; INTRATHECAL; INTRAVENOUS at 12:22

## 2024-11-17 RX ADMIN — KETOROLAC TROMETHAMINE 30 MG: 30 INJECTION, SOLUTION INTRAMUSCULAR; INTRAVENOUS at 14:58

## 2024-11-17 RX ADMIN — SODIUM CHLORIDE 2 G: 900 INJECTION INTRAVENOUS at 11:49

## 2024-11-17 RX ADMIN — SODIUM BICARBONATE 1 ML: 84 INJECTION INTRAVENOUS at 11:50

## 2024-11-17 RX ADMIN — ACETAMINOPHEN 1000 MG: 500 TABLET ORAL at 11:53

## 2024-11-17 RX ADMIN — SODIUM CITRATE AND CITRIC ACID MONOHYDRATE 30 ML: 500; 334 SOLUTION ORAL at 11:52

## 2024-11-17 RX ADMIN — LIDOCAINE HYDROCHLORIDE,EPINEPHRINE BITARTRATE 11 ML: 20; .005 INJECTION, SOLUTION EPIDURAL; INFILTRATION; INTRACAUDAL; PERINEURAL at 11:50

## 2024-11-17 RX ADMIN — BUPIVACAINE HYDROCHLORIDE 6 ML: 2.5 INJECTION, SOLUTION EPIDURAL; INFILTRATION; INTRACAUDAL; PERINEURAL at 10:05

## 2024-11-17 NOTE — OP NOTE
Operative Note    Patient name: Moises Vasquez  YOB: 1995   MRN: [id number]  Admission Date: 2024  Referring Provider: Natasha Harper MD    ID: 29 y.o.  at 40w4d    Preoperative Diagnosis:   29 y.o.  intrauterine pregnancy at 40w4d with bullard gestation.     Normal labor    Asthma affecting pregnancy in third trimester    GBS (group B Streptococcus carrier), +RV culture, currently pregnant    Non-reassuring fetal heart rate, delivered, current hospitalization      Postoperative Diagnosis: Same as above    Procedure(s): primary low transverse  delivery     Procedures:    *  SECTION PRIMARY    Procedure(s):   SECTION PRIMARY    Surgeon: Ben Hunt MD    Assistant: Jarrod Spangler MD    Staff:  Anesthesiologist: Alec Marc DO   Circulator: Patti Burdick RN  Scrub Person: Destiney Hernández  NICU Nurse: Qian Joseph RN  Baby Nurse: Dari Clark RN  OB TECH: Daniela Alcantar  NICU APRN: Valerie Polanco, APRN  Respiratory Therapist: Kitty Cr, RRT     Anesthesia: Epidural    Estimated Blood Loss:  650  mL    Preoperative antibiotic: Cefazolin 2g  and Azithromycin    Blood products:   Blood Administration Record (From admission, onward)      None            Pathology:   Order Name Source Comment Collection Info Order Time   TISSUE PATHOLOGY EXAM Placenta  Collected By: Patti Burdick RN 2024 12:37 PM     Specimen source(s):   Placenta          Release to patient   Routine Release            Drains: Puri catheter to gravity    Complications: None    Condition: Stable to recovery room    Infant:                Gender: female infant    Weight: 3470 g (7 lb 10.4 oz)    Apgars: 9  @ 1 minute /     9  @ 5 minutes    Cord gases: Venous:    pH, Cord Venous   Date Value Ref Range Status   2024 7.407 (H) 7.310 - 7.370 pH Units Final     Base Excess, Cord Venous   Date Value Ref Range Status   2024 0.0 0.0 -  2.0 mmol/L Final        Arterial:    pH, Cord Arterial   Date Value Ref Range Status   2024 7.34 (H) 7.22 - 7.30 pH Units Final     Base Exc, Cord Arterial   Date Value Ref Range Status   2024 0.9 0.0 - 2.0 mmol/L Final          Findings:   Uterus: very thin lower uterine segment with elbow palpable through uterine wall  Fallopian Tubes: Normal bilaterally  Ovaries: Normal Bilaterally  Adhesions: Absent  Other: Very prominent sacrum    Operative Summary:   After obtaining informed consent the patient was taken to the operating room where adequate anesthesia was obtained.  Puri catheter was placed in the bladder preoperatively.  The above listed IV antibiotics were given preoperatively.       The abdomen was prepped and draped in the usual sterile fashion for  delivery.  A vaginal prep was performed. After confirming adequate anesthesia, a Pfannenstiel skin incision was made with the scalpel and carried through to the underlying layer of fascia.  The fascia was incised in the midline and the incision extended laterally in blunt fashion. The fascia was dissected off the rectus muscle in blunt fashion.      The rectus muscles were  bluntly.  Prior to peritoneal entry, and elbow was palpable through the peritoneum.    The peritoneum was identified and entered bluntly. After peritoneal entry, it became clear there was only a thin lower uterine segment, not uterine rupture.     A bladder blade was inserted. The vesicouterine peritoneum was identified and was felt to be well beneath the site of our proposed hysterotomy. The uterus was incised with the scalpel in a low transverse fashion.  The uterine incision was entered digitally and the incision extended bluntly in a cranial-caudal fashion.  Membranes were ruptured for thick meconium stained fluid.  The fetal head was delivered atraumatically using the usual maneuvers from the direct occiput posterior position.  The remainder of the infant  was delivered, a spontaneous cry was heard, and the infant appeared to be moving all 4 extremities.  The umbilical cord was clamped and cut immediately at the NICU team's recommendation. The infant was handed off to waiting pediatric staff.       Cord blood gases were collected from a clamped segment of umbilical cord.  Cord blood was collected. The placenta was removed manually.  The uterus was exteriorized, and cleared of all clots and debris.  The uterine incision was repaired with #1 Vicryl in a running locked fashion. A second layer was not placed.  Additional hemostatic measures required: figure-of-eight sutures.    The incision was inspected and excellent hemostasis was noted.  The tubes and ovaries were noted to be normal. The appendix was not visualized.. The bony pelvis was felt to be narrow due to a very prominent sacrum.     The uterus was returned to the abdomen.  The gutters were cleared of all clots and debris.  Irrigation was used. The uterine incision was again inspected and found to be hemostatic.       The peritoneum was unable to be reapproximated.  The fascia was closed with #1 Vicryl in a running fashion.  The subcutaneous space was reapproximated using 3-0 Vicryl in interrupted fashion.    The skin was closed using 3-0 Monocryl on a Jewel needle in a subcutaneous fashion.  Skin glue was applied.  The patient tolerated the procedure well.  All sponge, instrument, and needle counts were correct the patient was transferred to the recovery room in stable condition.    Dr. Spangler   was responsible for performing the following activities: Retraction, Suction, Irrigation, and Delivery of Fetus and their skilled assistance was necessary for the success of this case.     Ben Hunt MD  11/17/2024  12:46 EST

## 2024-11-17 NOTE — PROGRESS NOTES
At bedside to obtain scalp stim.  Scalp stim obtained.  Discussed prolonged periods of minimal variability and recurrent late decelerations with the patient and her partner.  Cervix is still 8, maybe 9 cm but the head is not well applied.      I recommend that we either 1.)  Proceed with a  or 2.)  We can trial Pitocin.  I have concerns that the baby will not tolerate Pitocin, but I do want to expedite delivery, and ideally we would achieve delivery within the next hour.  I explained that I am trying to be understanding of the patient's birth preferences but ultimately they are aware of our concerns for the fetus' wellbeing. Patient and her partner would like to discuss amongst themselves. Will revisit in 10-15 min.     All questions answered to best of my ability.     Ben Hunt MD  Obstetrics and Gynecology  2024 11:18 EST

## 2024-11-17 NOTE — PROGRESS NOTES
At bedside.  Patient's been pushing for roughly 3 and half hours.  Per RN, there is concern that she is not actually completely dilated but may be 8 cm.  Membranes remain intact.  I asked the patient if I could check her cervix and possibly rupture membranes in order to expedite delivery given category 2 tracing.  Her  would like 5 minutes to discuss this with her.    Ben Hunt MD  Obstetrics and Gynecology  11/17/2024 08:55 EST

## 2024-11-17 NOTE — ANESTHESIA PROCEDURE NOTES
Labor Epidural      Patient reassessed immediately prior to procedure    Patient location during procedure: OB  Performed By  Anesthesiologist: Alec Marc DO  Preanesthetic Checklist  Completed: patient identified, IV checked, site marked, risks and benefits discussed, surgical consent, monitors and equipment checked, pre-op evaluation and timeout performed  Prep:  Pt Position:sitting  Sterile Tech:gloves, mask, sterile barrier and cap  Prep:chlorhexidine gluconate and isopropyl alcohol  Monitoring:blood pressure monitoring and continuous pulse oximetry  Epidural Block Procedure:  Approach:midline  Guidance:landmark technique and palpation technique  Location:L3-L4  Needle Type:Tuohy  Needle Gauge:17 G  Loss of Resistance Medium: air  Loss of Resistance: 4cm  Cath Depth at skin:10 cm  Paresthesia: none  Aspiration:negative  Test Dose:negative  Number of Attempts: 1  Post Assessment:  Dressing:secured with tape and occlusive dressing applied (Tegaderm Placed)  Pt Tolerance:patient tolerated the procedure well with no apparent complications  Complications:no

## 2024-11-17 NOTE — ANESTHESIA POSTPROCEDURE EVALUATION
Patient: Moises Vasquez    Procedure Summary       Date: 24 Room / Location: [facility] LABOR DELIVERY 2 /  [facility]    Anesthesia Start: 952 Anesthesia Stop:     Procedure:  SECTION PRIMARY (Abdomen) Diagnosis:     Surgeons: Sudhakar Hunt MD Provider: Alec Marc DO    Anesthesia Type: epidural ASA Status: 2            Anesthesia Type: epidural    Vitals  Vitals Value Taken Time   BP 96/67 24 1247   Temp 98.7 °F (37.1 °C) 24 1247   Pulse 105 24 1250   Resp 18 24 1247   SpO2 97 % 24 1250   Vitals shown include unfiled device data.        Post Anesthesia Care and Evaluation    Patient location during evaluation: bedside  Patient participation: complete - patient participated  Level of consciousness: awake  Pain score: 0  Pain management: satisfactory to patient    Airway patency: patent  Anesthetic complications: No anesthetic complications  PONV Status: none  Cardiovascular status: acceptable and hemodynamically stable  Respiratory status: acceptable  Hydration status: acceptable

## 2024-11-17 NOTE — PROGRESS NOTES
Expressed continued concerns with FHT. Recommend . Patient agreeable after counseling. Ancef/Priti on call to OR.    Ben Hunt MD  Obstetrics and Gynecology  2024 11:39 EST

## 2024-11-17 NOTE — PROGRESS NOTES
After extensive counseling, the patient and her  were open to AROM.  One of their concerns was that the amniotic membrane is preventing exposure to GBS.  The patient and her  have previously declined GBS prophylaxis.  I acknowledged that the membrane is protecting the fetus from group B strep, but ultimately needs to be ruptured in order to pursue a vaginal delivery.  I discussed that the fact the patient has been 8cm since 9 PM last night is very atypical.  We discussed the typical labor curve and the typical definitions of arrest of dilation.  I reviewed that first, I would like to ultrasound the patient to confirm the patient is actually cephalic.  Bedside ultrasound confirmed a cephalic fetus in occiput posterior position.  Cervical exam revealed her to be 8/90/0.  AROM for copious amounts of very thick green meconium stained fluid.  Meconium discussed with patient.  Discussed that DRT to be present delivery.  Discussed possible need for infant to be brought over to the warmer for resuscitation and deep suction prior to initiation of skin to skin.  All questions answered to the best my ability. Patient's  inquired about epidural. Patient can get epidural prn. They were open to fluid resuscitation given Category 2 tracing.     Ben Hunt MD  Obstetrics and Gynecology  11/17/2024 09:27 EST

## 2024-11-17 NOTE — ANESTHESIA PREPROCEDURE EVALUATION
Anesthesia Evaluation     Patient summary reviewed and Nursing notes reviewed                Airway   Mallampati: II  TM distance: >3 FB  Neck ROM: full  No difficulty expected  Dental      Pulmonary    (+) asthma,  Cardiovascular - negative cardio ROS        Neuro/Psych- negative ROS  GI/Hepatic/Renal/Endo - negative ROS     Musculoskeletal (-) negative ROS    Abdominal    Substance History - negative use     OB/GYN    (+) Pregnant        Other                    Anesthesia Plan    ASA 2     epidural       Anesthetic plan, risks, benefits, and alternatives have been provided, discussed and informed consent has been obtained with: patient.    Use of blood products discussed with patient .      CODE STATUS:    Level Of Support Discussed With: Patient  Code Status (Patient has no pulse and is not breathing): CPR (Attempt to Resuscitate)  Medical Interventions (Patient has pulse or is breathing): Full Support

## 2024-11-18 LAB
BASOPHILS # BLD AUTO: 0.02 10*3/MM3 (ref 0–0.2)
BASOPHILS NFR BLD AUTO: 0.1 % (ref 0–1.5)
DEPRECATED RDW RBC AUTO: 45.8 FL (ref 37–54)
EOSINOPHIL # BLD AUTO: 0 10*3/MM3 (ref 0–0.4)
EOSINOPHIL NFR BLD AUTO: 0 % (ref 0.3–6.2)
ERYTHROCYTE [DISTWIDTH] IN BLOOD BY AUTOMATED COUNT: 13.3 % (ref 12.3–15.4)
HCT VFR BLD AUTO: 33.1 % (ref 34–46.6)
HGB BLD-MCNC: 11 G/DL (ref 12–15.9)
IMM GRANULOCYTES # BLD AUTO: 0.06 10*3/MM3 (ref 0–0.05)
IMM GRANULOCYTES NFR BLD AUTO: 0.4 % (ref 0–0.5)
LYMPHOCYTES # BLD AUTO: 1.94 10*3/MM3 (ref 0.7–3.1)
LYMPHOCYTES NFR BLD AUTO: 14.3 % (ref 19.6–45.3)
MCH RBC QN AUTO: 31.5 PG (ref 26.6–33)
MCHC RBC AUTO-ENTMCNC: 33.2 G/DL (ref 31.5–35.7)
MCV RBC AUTO: 94.8 FL (ref 79–97)
MONOCYTES # BLD AUTO: 1.06 10*3/MM3 (ref 0.1–0.9)
MONOCYTES NFR BLD AUTO: 7.8 % (ref 5–12)
NEUTROPHILS NFR BLD AUTO: 10.46 10*3/MM3 (ref 1.7–7)
NEUTROPHILS NFR BLD AUTO: 77.4 % (ref 42.7–76)
NRBC BLD AUTO-RTO: 0 /100 WBC (ref 0–0.2)
PLATELET # BLD AUTO: 214 10*3/MM3 (ref 140–450)
PMV BLD AUTO: 9.9 FL (ref 6–12)
RBC # BLD AUTO: 3.49 10*6/MM3 (ref 3.77–5.28)
WBC NRBC COR # BLD AUTO: 13.54 10*3/MM3 (ref 3.4–10.8)

## 2024-11-18 PROCEDURE — 25010000002 KETOROLAC TROMETHAMINE PER 15 MG: Performed by: OBSTETRICS & GYNECOLOGY

## 2024-11-18 PROCEDURE — 0503F POSTPARTUM CARE VISIT: CPT | Performed by: ADVANCED PRACTICE MIDWIFE

## 2024-11-18 PROCEDURE — 85025 COMPLETE CBC W/AUTO DIFF WBC: CPT | Performed by: OBSTETRICS & GYNECOLOGY

## 2024-11-18 PROCEDURE — 25010000002 ENOXAPARIN PER 10 MG: Performed by: OBSTETRICS & GYNECOLOGY

## 2024-11-18 RX ADMIN — PRENATAL VITAMINS-IRON FUMARATE 27 MG IRON-FOLIC ACID 0.8 MG TABLET 1 TABLET: at 08:34

## 2024-11-18 RX ADMIN — KETOROLAC TROMETHAMINE 15 MG: 15 INJECTION, SOLUTION INTRAMUSCULAR; INTRAVENOUS at 03:16

## 2024-11-18 RX ADMIN — ACETAMINOPHEN 650 MG: 325 TABLET ORAL at 18:47

## 2024-11-18 RX ADMIN — HYDROXYZINE HYDROCHLORIDE 50 MG: 25 TABLET ORAL at 08:43

## 2024-11-18 RX ADMIN — ACETAMINOPHEN 1000 MG: 500 TABLET ORAL at 00:01

## 2024-11-18 RX ADMIN — SENNOSIDES AND DOCUSATE SODIUM 1 TABLET: 50; 8.6 TABLET ORAL at 08:34

## 2024-11-18 RX ADMIN — KETOROLAC TROMETHAMINE 15 MG: 15 INJECTION, SOLUTION INTRAMUSCULAR; INTRAVENOUS at 22:38

## 2024-11-18 RX ADMIN — ENOXAPARIN SODIUM 40 MG: 100 INJECTION SUBCUTANEOUS at 20:06

## 2024-11-18 RX ADMIN — KETOROLAC TROMETHAMINE 15 MG: 15 INJECTION, SOLUTION INTRAMUSCULAR; INTRAVENOUS at 08:34

## 2024-11-18 RX ADMIN — SIMETHICONE 80 MG: 80 TABLET, CHEWABLE ORAL at 20:06

## 2024-11-18 RX ADMIN — KETOROLAC TROMETHAMINE 15 MG: 15 INJECTION, SOLUTION INTRAMUSCULAR; INTRAVENOUS at 16:54

## 2024-11-18 RX ADMIN — ACETAMINOPHEN 1000 MG: 500 TABLET ORAL at 06:12

## 2024-11-18 RX ADMIN — ACETAMINOPHEN 1000 MG: 500 TABLET ORAL at 13:00

## 2024-11-18 RX ADMIN — SENNOSIDES AND DOCUSATE SODIUM 1 TABLET: 50; 8.6 TABLET ORAL at 20:06

## 2024-11-18 NOTE — ANESTHESIA POSTPROCEDURE EVALUATION
Patient: Moises Vasquez    Procedure Summary       Date: 24 Room / Location: [facility] LABOR DELIVERY 2 /  [facility]    Anesthesia Start: 952 Anesthesia Stop:     Procedure:  SECTION PRIMARY (Abdomen) Diagnosis:     Surgeons: Sudhakar Hunt MD Provider: Alec Marc DO    Anesthesia Type: epidural ASA Status: 2            Anesthesia Type: epidural    Vitals  Vitals Value Taken Time   BP 91/56 24 0723   Temp 98.3 °F (36.8 °C) 24 0723   Pulse 72 24 0723   Resp 16 24 0723   SpO2 97 % 24 1451   Vitals shown include unfiled device data.        Post Anesthesia Care and Evaluation    Patient location during evaluation: bedside  Patient participation: complete - patient participated  Level of consciousness: awake and alert  Pain management: adequate    Airway patency: patent  Anesthetic complications: No anesthetic complications    Cardiovascular status: acceptable  Respiratory status: acceptable  Hydration status: acceptable  Post Neuraxial Block status: Motor and sensory function returned to baseline and No signs or symptoms of PDPH

## 2024-11-18 NOTE — LACTATION NOTE
11/18/24 1013   Maternal Information   Date of Referral 11/18/24   Person Making Referral lactation consultant   Maternal Reason for Referral no prior breastfeeding experience  (courtesy visit for new delivery. Followed up for latch check)   Maternal Assessment   Breast Size Issue none   Breast Shape Bilateral:;round   Breast Density Bilateral:;soft   Nipples Bilateral:;everted   Left Nipple Symptoms intact;nontender   Right Nipple Symptoms intact;nontender   Maternal Infant Feeding   Maternal Emotional State receptive;relaxed   Infant Positioning clutch/football  (left)   Signs of Milk Transfer deep jaw excursions noted;transfer present   Pain with Feeding no   Comfort Measures Before/During Feeding maternal position adjusted;latch adjusted  (educated pt on how to break latch if she needs to reposition infant for feedings.)   Support Person Involvement actively supporting mother   Milk Expression/Equipment   Breast Pump Type double electric, personal;double electric, hospital grade  (Pt encouraged to pump after feeds until milk is in & infant is gaining wt.)

## 2024-11-18 NOTE — PROGRESS NOTES
Postpartum Progress Note    Patient name: Moises Vasquez  YOB: 1995   MRN: [id number]  Referring Provider: Natasha Harper MD  Admission Date: 2024  Date of Service: 2024    ID: 29 y.o.     Diagnosis:   S/p  delivery 1 Day Post-Op     Normal labor    Asthma affecting pregnancy in third trimester    GBS (group B Streptococcus carrier), +RV culture, currently pregnant    Non-reassuring fetal heart rate, delivered, current hospitalization       Subjective:      No complaints.  Moderate lochia.  Ambulating, voiding, tolerating diet.  Pain well controlled.  The patient is currently breastfeeding.   This baby is a girl.    Objective:      Vital signs:  Vital Signs Range for the last 24 hours  Temperature: Temp:  [97.8 °F (36.6 °C)-98.9 °F (37.2 °C)] 98.2 °F (36.8 °C)   Temp Source: Temp src: Oral   BP: BP: ()/(55-80) 97/57   Pulse: Heart Rate:  [] 81   Respirations: Resp:  [16-18] 16   Weight: 78 kg (172 lb)     General: Alert & oriented x4, in no apparent distress  Abdomen: soft, nontender  Uterus: firm, nontender  Incision: clean, dry, intact. Glue in place.  Extremities: nontender; no edema      Labs:  Lab Results   Component Value Date    WBC 13.54 (H) 2024    HGB 11.0 (L) 2024    HCT 33.1 (L) 2024    MCV 94.8 2024     2024     Results from last 7 days   Lab Units 24  1625   ABO TYPING  A   RH TYPING  Positive     External Prenatal Results       Pregnancy Outside Results - Transcribed From Office Records - See Scanned Records For Details       Test Value Date Time    ABO  A  24 1625    Rh  Positive  24 1625    Antibody Screen  Negative  24 1502       Negative  24 0946       Negative  24 1427    Varicella IgG       Rubella  2.61 index 24 1427    Hgb  11.0 g/dL 24 0452       13.7 g/dL 24 1502       12.0 g/dL 24 0946       13.1 g/dL 24 1427    Hct   33.1 % 24 0452       40.3 % 24 1502       36.0 % 24 0946       39.0 % 24 1427    HgB A1c        1h GTT  103 mg/dL 24 0946    3h GTT Fasting       3h GTT 1 hour       3h GTT 2 hour       3h GTT 3 hour        Gonorrhea (discrete)  Negative  24 1427    Chlamydia (discrete)  Negative  24 1427    RPR  Non Reactive  24 0946       Non Reactive  24 1427    Syphils cascade: TP-Ab (FTA)  Non-Reactive  24 1502    TP-Ab  Non-Reactive  24 1502    TP-Ab (EIA)       TPPA       HBsAg  Negative  24 1427    Herpes Simplex Virus PCR       Herpes Simplex VIrus Culture       HIV  Non Reactive  24 1427    Hep C RNA Quant PCR       Hep C Antibody  Non Reactive  24 1427    AFP       NIPT       Cystic Fibrosis (Emma)       Cystic Fibroisis        Spinal Muscular atrophy       Fragile X       Group B Strep  Streptococcus agalactiae (Group B)  10/22/24 1833    GBS Susceptibility to Clindamycin       GBS Susceptibility to Erythromycin       Fetal Fibronectin       Genetic Testing, Maternal Blood                 Drug Screening       Test Value Date Time    Urine Drug Screen       Amphetamine Screen  Negative  24 1523    Barbiturate Screen  Negative  24 1523    Benzodiazepine Screen  Negative  24 1523    Methadone Screen  Negative  24 1523    Phencyclidine Screen  Negative  24 1523    Opiates Screen  Negative  24 1523    THC Screen  Negative  24 1523    Cocaine Screen       Propoxyphene Screen       Buprenorphine Screen  Negative  24 1523    Methamphetamine Screen       Oxycodone Screen  Negative  24 1523    Tricyclic Antidepressants Screen  Negative  24 1523              Legend    ^: Historical                            Assessment/Plan:      1 Day Post-Op s/p Procedure(s):   SECTION PRIMARY  1. S/p  delivery: Continue postoperative care.  Doing well.  2. Infant feeding: Supportive  care.  The patient is currently breastfeeding.

## 2024-11-18 NOTE — LACTATION NOTE
24 1848   Maternal Information   Date of Referral 24   Person Making Referral lactation consultant   Maternal Reason for Referral no prior breastfeeding experience   Infant Reason for Referral  infant   Maternal Infant Feeding   Maternal Emotional State receptive;relaxed;other (see comments)  (N/V at end of visit, enc to call out for latch check)   Pain with Feeding no  (per pt report)   Milk Expression/Equipment   Breast Pump Type double electric, personal  (spectra and zachary at home)   Equipment for Home Use new breast pump purchased   Lactation Referrals   Lactation Referrals outpatient lactation program  (as needed PRN after discharge)        Courtesy visit for newly postpartum couplet with no prior breastfeeding experience. MOB reports no pain with latch and that infant seems to latch well. Reports feeding times between 10-18 minutes with last two feedings. Visitors at  do report infant has been spitty at times. MOB feeling unwell towards end of my visit with N/V. Encouraged to call out for latch check, verbalized understanding.     Educational handouts provided, booklet beginning on page 36 reviewed with patient regarding breastfeeding education. Encouraged to call out for concerns through hospitalization and f/u with outpatient clinic PRN after discharge. Denies further questions or concerns at this time.

## 2024-11-18 NOTE — PLAN OF CARE
Goal Outcome Evaluation:  Plan of Care Reviewed With: patient, spouse        Progress: improving  Outcome Evaluation: VSS, fundus/lochia/incision WDL, pain well controlled with oral pain meds.

## 2024-11-19 VITALS
HEIGHT: 61 IN | BODY MASS INDEX: 32.47 KG/M2 | DIASTOLIC BLOOD PRESSURE: 53 MMHG | OXYGEN SATURATION: 97 % | TEMPERATURE: 97.3 F | WEIGHT: 172 LBS | HEART RATE: 73 BPM | RESPIRATION RATE: 18 BRPM | SYSTOLIC BLOOD PRESSURE: 97 MMHG

## 2024-11-19 LAB
CYTO UR: NORMAL
LAB AP CASE REPORT: NORMAL
LAB AP CLINICAL INFORMATION: NORMAL
PATH REPORT.FINAL DX SPEC: NORMAL
PATH REPORT.GROSS SPEC: NORMAL

## 2024-11-19 PROCEDURE — 25010000002 KETOROLAC TROMETHAMINE PER 15 MG: Performed by: OBSTETRICS & GYNECOLOGY

## 2024-11-19 PROCEDURE — 0503F POSTPARTUM CARE VISIT: CPT | Performed by: OBSTETRICS & GYNECOLOGY

## 2024-11-19 RX ORDER — IBUPROFEN 600 MG/1
600 TABLET, FILM COATED ORAL EVERY 6 HOURS
Qty: 30 TABLET | Refills: 0 | Status: SHIPPED | OUTPATIENT
Start: 2024-11-19

## 2024-11-19 RX ADMIN — ACETAMINOPHEN 650 MG: 325 TABLET ORAL at 01:08

## 2024-11-19 RX ADMIN — KETOROLAC TROMETHAMINE 15 MG: 15 INJECTION, SOLUTION INTRAMUSCULAR; INTRAVENOUS at 04:15

## 2024-11-19 RX ADMIN — IBUPROFEN 600 MG: 600 TABLET, FILM COATED ORAL at 17:01

## 2024-11-19 RX ADMIN — PRENATAL VITAMINS-IRON FUMARATE 27 MG IRON-FOLIC ACID 0.8 MG TABLET 1 TABLET: at 09:24

## 2024-11-19 RX ADMIN — ACETAMINOPHEN 650 MG: 325 TABLET ORAL at 06:35

## 2024-11-19 RX ADMIN — ACETAMINOPHEN 650 MG: 325 TABLET ORAL at 13:37

## 2024-11-19 RX ADMIN — IBUPROFEN 600 MG: 600 TABLET, FILM COATED ORAL at 11:40

## 2024-11-19 RX ADMIN — SENNOSIDES AND DOCUSATE SODIUM 1 TABLET: 50; 8.6 TABLET ORAL at 09:25

## 2024-11-19 NOTE — LACTATION NOTE
Mom reports infant is latching and nursing well but nipples are tender.  Just finished nursing infant.  Parents asked how to assist with soreness.  Encouraged tummy to mom positioning and nipple to nose.  Discussed asymmetrical latch to assist with getting deeper.  Also encouraged FST prior to nursing to assist infant with appropriate suck prior to latching. Mom to call for assistance prn.

## 2024-11-19 NOTE — DISCHARGE SUMMARY
Discharge Summary    Date of Admission: 2024  Date of Discharge:  2024      Patient: Moises Vasquez      MR#:[id number]    Primary Surgeon/OB: Ben Hunt MD    Discharge Surgeon/OB: Natasha Harper    Presenting Problem/History of Present Illness  Normal labor [O80, Z37.9]     Active Hospital Problems    Diagnosis     **Normal labor     Delivery of pregnancy by  section      2024-primary  section for failure to progress 7 pounds 10 ounces at 40 weeks and 4 days      Non-reassuring fetal heart rate, delivered, current hospitalization     GBS (group B Streptococcus carrier), +RV culture, currently pregnant      Treat in labor      Asthma affecting pregnancy in third trimester           Discharge Diagnosis:  section at 40w4d    Procedures:  , Low Transverse    2024   12:19 PM       Discharge Date: 2024; Discharge Time: 14:53 EST    Hospital Course  Patient is a 29 y.o. female  at 40w4d status post  section with uneventful postoperative recovery.  Patient was advanced to regular diet on postoperative day#1.  On discharge, ambulating, tolerating a regular diet without any difficulties and her incision is dry, clean and intact.     Infant:   female fetus 3470 g (7 lb 10.4 oz) with Apgar scores of 9 , 9  at five minutes.    Condition on Discharge:  Stable    Vital Signs  Temp:  [97.3 °F (36.3 °C)-98.9 °F (37.2 °C)] 97.3 °F (36.3 °C)  Heart Rate:  [63-97] 73  Resp:  [16-18] 18  BP: ()/(53-63) 97/53    Lab Results   Component Value Date    WBC 13.54 (H) 2024    HGB 11.0 (L) 2024    HCT 33.1 (L) 2024    MCV 94.8 2024     2024     Results from last 7 days   Lab Units 24  1502   TREPONEMA PALLIDUM AB TOTAL  Non-Reactive     Discharge Disposition  Home or Self Care    Discharge Medications     Discharge Medications        New Medications        Instructions Start Date   ibuprofen 600 MG  tablet  Commonly known as: ADVIL,MOTRIN   600 mg, Oral, Every 6 Hours             Continue These Medications        Instructions Start Date   acetaminophen 500 MG tablet  Commonly known as: TYLENOL   500 mg, Every 6 Hours PRN      albuterol sulfate  (90 Base) MCG/ACT inhaler  Commonly known as: PROVENTIL HFA;VENTOLIN HFA;PROAIR HFA   2 puffs, Inhalation, Every 4 Hours PRN      prenatal (CLASSIC) vitamin 28-0.8 MG tablet tablet  Generic drug: prenatal vitamin   Daily               Discharge Diet:     Activity at Discharge:   Activity Instructions       Activity as Tolerated      Other Instructions (Specify)      No driving for 2 weeks, No lifting over 10lbs for 6 weeks.    Pelvic Rest      Pelvic rest including no tampons, no tub baths, and no intercourse until 6 weeks/cleared by Provider.            Follow-up Appointments  No future appointments.  Additional Instructions for the Follow-ups that You Need to Schedule       Call MD for problems / concerns.   As directed      Please call with concerns of depression.    Order Comments: Please call with concerns of depression.         Discharge Follow-up with Specialty: Dr. Harper office; 2 Weeks   As directed      Specialty: Dr. Harper office   Follow Up: 2 Weeks   Follow Up Details: incision check                Natasha Harper MD  11/19/24  14:53 EST  Csd

## 2025-01-09 ENCOUNTER — TELEPHONE (OUTPATIENT)
Dept: OBSTETRICS AND GYNECOLOGY | Facility: CLINIC | Age: 30
End: 2025-01-09

## 2025-01-09 NOTE — TELEPHONE ENCOUNTER
Caller: Moises Vasquez    Relationship: Self    Best call back number: [phone] (home)     What form or medical record are you requesting: RETURN TO WORK    Who is requesting this form or medical record from you: EMPLOYER - [employer]    How would you like to receive the form or medical records (pick-up, mail, fax): FAX  If fax, what is the fax number: [phone]    Timeframe paperwork needed: ASAP     Additional notes: JUST A SIMPLE NOTE STATING 8 WEEKS POST PARTUM RECOVERY. WANTING TO RETURN TO WORK ON 2/03/2025.  PLEASE INCLUDE CLAIM # [id number]

## 2025-01-10 NOTE — TELEPHONE ENCOUNTER
Called pt and scheduled her PP visit. Told her we will not be able to write the note until that visit is complete since pt hasn't been seen since delivery in November. Told pt she can get the note from checkout after that visit.

## 2025-01-16 ENCOUNTER — POSTPARTUM VISIT (OUTPATIENT)
Dept: OBSTETRICS AND GYNECOLOGY | Facility: CLINIC | Age: 30
End: 2025-01-16
Payer: COMMERCIAL

## 2025-01-16 VITALS — HEIGHT: 61 IN | WEIGHT: 147.4 LBS | BODY MASS INDEX: 27.83 KG/M2

## 2025-01-16 PROBLEM — O99.820 GBS (GROUP B STREPTOCOCCUS CARRIER), +RV CULTURE, CURRENTLY PREGNANT: Status: RESOLVED | Noted: 2024-10-24 | Resolved: 2025-01-16

## 2025-01-16 PROBLEM — Z34.90 PRENATAL CARE, ANTEPARTUM: Status: RESOLVED | Noted: 2024-04-12 | Resolved: 2025-01-16

## 2025-01-16 PROBLEM — Z37.9 NORMAL LABOR: Status: RESOLVED | Noted: 2024-11-16 | Resolved: 2025-01-16

## 2025-01-16 PROBLEM — O99.513 ASTHMA AFFECTING PREGNANCY IN THIRD TRIMESTER: Status: RESOLVED | Noted: 2021-07-22 | Resolved: 2025-01-16

## 2025-01-16 PROBLEM — J45.909 ASTHMA AFFECTING PREGNANCY IN THIRD TRIMESTER: Status: RESOLVED | Noted: 2021-07-22 | Resolved: 2025-01-16

## 2025-01-16 NOTE — PROGRESS NOTES
Chief Complaint   Patient presents with    Postpartum Care       Postpartum Visit         Lulanimeshcésar Vasquez is a 29 y.o.  who presents today for a 8 week(s) postpartum check.     C/S: failure to progress     , Low Transverse   Information for the patient's :  Catherine Vasquez [[id number]]   2024   female   Catherine Vasquez   3470 g (7 lb 10.4 oz)   Gestational Age: 40w4d       Baby Discharged: Discharged with Mom  Delivering Physician:  Ben Hunt MD    Her pregnancy was complicated by no known issues. The incision is healing well. Patient describes vaginal bleeding as absent.  Patient is breastfeeding.  She desires  none  for contraception.      She would like to discuss the following complaints today: none. Pt declines pap smear but is okay with pelvic exam.     Patient denies concerns for postpartum depression/anxiety. Patient denies  suicidal or homicidal ideation. Her postpartum depression screening questionnaire: 0. No treatment is indicated      Last Pap : 2022. Results: negative. HPV: unknown .  Declines pap smear today  Last Completed Pap Smear            PAP SMEAR (Every 3 Years) Next due on 2022  LIQUID-BASED PAP SMEAR, [facility] ([facility])    2016  Done                      The additional following portions of the patient's history were reviewed and updated as appropriate: allergies, current medications, past family history, past medical history, past social history, past surgical history, and problem list.    Review of Systems   Constitutional: Negative.    HENT: Negative.     Eyes: Negative.    Respiratory: Negative.     Cardiovascular: Negative.    Gastrointestinal: Negative.    Endocrine: Negative.    Genitourinary: Negative.    Musculoskeletal: Negative.    Skin:  Positive for wound.   Allergic/Immunologic: Negative.    Neurological: Negative.    Hematological: Negative.    Psychiatric/Behavioral: Negative.    PATIENT INFORMED RX APPROVED AND SENT TO THEIR PHARM   "    All other systems reviewed and are negative.     I have reviewed and agree with the HPI, ROS, and historical information as entered above. Natasha Harper MD      Ht 154.9 cm (61\")   Wt 66.9 kg (147 lb 6.4 oz)   LMP 2024   Breastfeeding Yes   BMI 27.85 kg/m²     Physical Exam  Vitals and nursing note reviewed. Exam conducted with a chaperone present.   Constitutional:       Appearance: She is well-developed.   HENT:      Head: Normocephalic and atraumatic.   Pulmonary:      Effort: Pulmonary effort is normal.   Abdominal:      General: A surgical scar is present.      Palpations: Abdomen is soft. Abdomen is not rigid.      Comments: Clean, Dry, and Intact.  No erythema.    Genitourinary:     Vagina: No lesions or prolapsed vaginal walls.      Cervix: No lesion or erythema.      Uterus: Enlarged. Not tender and no uterine prolapse.       Adnexa:         Right: No mass, tenderness or fullness.          Left: No mass, tenderness or fullness.     Musculoskeletal:      Cervical back: Normal range of motion.   Neurological:      Mental Status: She is alert and oriented to person, place, and time.   Psychiatric:         Mood and Affect: Mood normal.         Behavior: Behavior normal.             Assessment and Plan    Problem List Items Addressed This Visit          Gravid and     Delivery of pregnancy by  section    Overview     2024-primary  section for failure to progress 7 pounds 10 ounces at 40 weeks and 4 days          Other Visit Diagnoses       Postpartum follow-up    -  Primary            S/p , 8 week(s) postpartum.  Doing well.    Return to normal physical activity.  No pelvic restrictions.   Baby doing well.  Contraception: contraceptive methods: None  Recommend to avoid pregnancy until baby is 1 year of age or greater.  Okay for patient to return to work.  She has no further restrictions at this time.  Return in about 1 year (around 2026). "     Natasha Harper MD  01/16/2025

## (undated) DEVICE — ADHS LIQ MASTISOL 2/3ML

## (undated) DEVICE — 4-PORT MANIFOLD: Brand: NEPTUNE 2

## (undated) DEVICE — TRAP FLD MINIVAC MEGADYNE 100ML

## (undated) DEVICE — VIOLET BRAIDED (POLYGLACTIN 910), SYNTHETIC ABSORBABLE SUTURE: Brand: COATED VICRYL

## (undated) DEVICE — PATIENT RETURN ELECTRODE, SINGLE-USE, CONTACT QUALITY MONITORING, ADULT, WITH 9FT CORD, FOR PATIENTS WEIGING OVER 33LBS. (15KG): Brand: MEGADYNE

## (undated) DEVICE — 3M™ STERI-STRIP™ BLEND TONE SKIN CLOSURES, B1557, TAN, 1/2 IN X 4 IN (12MM X 100MM), 6 STRIPS/ENVELOPE: Brand: 3M™ STERI-STRIP™

## (undated) DEVICE — CLTH CLENS READYCLEANSE PERI CARE PK/5

## (undated) DEVICE — SUT VIC 3/0 CT1 27IN DYED J338H

## (undated) DEVICE — TRY SPINE BLCK WHITACRE 25G 3X5IN

## (undated) DEVICE — GLV SURG BIOGEL LTX PF 7 1/2

## (undated) DEVICE — GLV SURG BIOGEL M LTX PF 7

## (undated) DEVICE — SOL IRR H2O BO 1000ML STRL

## (undated) DEVICE — SOL IRR NACL 0.9PCT BO 1000ML

## (undated) DEVICE — COVER,TABLE,HVY DUTY,60"X90",STRL: Brand: MEDLINE

## (undated) DEVICE — SUT MNCRYL 3/0 27L Y523H BX/36

## (undated) DEVICE — MAT PREVALON MOBL TRANSFR AIR W/PAD REPROC 39X81IN

## (undated) DEVICE — PENCL SMOKE/EVAC MEGADYNE TELESCP 10FT

## (undated) DEVICE — APPL CHLORAPREP TINTED 26ML TEAL

## (undated) DEVICE — PK C/SECT 10